# Patient Record
Sex: MALE | Race: WHITE | Employment: FULL TIME | URBAN - NONMETROPOLITAN AREA
[De-identification: names, ages, dates, MRNs, and addresses within clinical notes are randomized per-mention and may not be internally consistent; named-entity substitution may affect disease eponyms.]

---

## 2021-08-26 ENCOUNTER — APPOINTMENT (OUTPATIENT)
Dept: GENERAL RADIOLOGY | Age: 71
DRG: 603 | End: 2021-08-26
Payer: COMMERCIAL

## 2021-08-26 ENCOUNTER — HOSPITAL ENCOUNTER (INPATIENT)
Age: 71
LOS: 1 days | Discharge: ANOTHER HOSPITAL | DRG: 603 | End: 2021-08-26
Attending: EMERGENCY MEDICINE | Admitting: INTERNAL MEDICINE
Payer: COMMERCIAL

## 2021-08-26 VITALS
RESPIRATION RATE: 14 BRPM | HEART RATE: 57 BPM | SYSTOLIC BLOOD PRESSURE: 185 MMHG | HEIGHT: 68 IN | BODY MASS INDEX: 25.76 KG/M2 | OXYGEN SATURATION: 100 % | TEMPERATURE: 97.5 F | WEIGHT: 170 LBS | DIASTOLIC BLOOD PRESSURE: 87 MMHG

## 2021-08-26 DIAGNOSIS — L08.9 RIGHT FOOT INFECTION: Primary | ICD-10-CM

## 2021-08-26 LAB
ANION GAP SERPL CALCULATED.3IONS-SCNC: 11 MMOL/L (ref 9–17)
BUN BLDV-MCNC: 14 MG/DL (ref 8–23)
BUN/CREAT BLD: 22 (ref 9–20)
CALCIUM SERPL-MCNC: 9.1 MG/DL (ref 8.6–10.4)
CHLORIDE BLD-SCNC: 102 MMOL/L (ref 98–107)
CO2: 25 MMOL/L (ref 20–31)
CREAT SERPL-MCNC: 0.63 MG/DL (ref 0.7–1.2)
GFR AFRICAN AMERICAN: >60 ML/MIN
GFR NON-AFRICAN AMERICAN: >60 ML/MIN
GFR SERPL CREATININE-BSD FRML MDRD: ABNORMAL ML/MIN/{1.73_M2}
GFR SERPL CREATININE-BSD FRML MDRD: ABNORMAL ML/MIN/{1.73_M2}
GLUCOSE BLD-MCNC: 106 MG/DL (ref 70–99)
INR BLD: 1.2
LACTIC ACID, SEPSIS WHOLE BLOOD: ABNORMAL MMOL/L (ref 0.5–1.9)
LACTIC ACID, SEPSIS WHOLE BLOOD: NORMAL MMOL/L (ref 0.5–1.9)
LACTIC ACID, SEPSIS: 0.6 MMOL/L (ref 0.5–1.9)
LACTIC ACID, SEPSIS: 2.4 MMOL/L (ref 0.5–1.9)
PARTIAL THROMBOPLASTIN TIME: 36.2 SEC (ref 23.9–33.8)
POTASSIUM SERPL-SCNC: 4.5 MMOL/L (ref 3.7–5.3)
PROTHROMBIN TIME: 14.8 SEC (ref 11.5–14.2)
SARS-COV-2, RAPID: NOT DETECTED
SODIUM BLD-SCNC: 138 MMOL/L (ref 135–144)
SPECIMEN DESCRIPTION: NORMAL

## 2021-08-26 PROCEDURE — 87076 CULTURE ANAEROBE IDENT EACH: CPT

## 2021-08-26 PROCEDURE — 85025 COMPLETE CBC W/AUTO DIFF WBC: CPT

## 2021-08-26 PROCEDURE — 87185 SC STD ENZYME DETCJ PER NZM: CPT

## 2021-08-26 PROCEDURE — 87205 SMEAR GRAM STAIN: CPT

## 2021-08-26 PROCEDURE — 86403 PARTICLE AGGLUT ANTBDY SCRN: CPT

## 2021-08-26 PROCEDURE — 36415 COLL VENOUS BLD VENIPUNCTURE: CPT

## 2021-08-26 PROCEDURE — 83605 ASSAY OF LACTIC ACID: CPT

## 2021-08-26 PROCEDURE — 1200000000 HC SEMI PRIVATE

## 2021-08-26 PROCEDURE — 99284 EMERGENCY DEPT VISIT MOD MDM: CPT

## 2021-08-26 PROCEDURE — 73610 X-RAY EXAM OF ANKLE: CPT

## 2021-08-26 PROCEDURE — 87075 CULTR BACTERIA EXCEPT BLOOD: CPT

## 2021-08-26 PROCEDURE — 87186 SC STD MICRODIL/AGAR DIL: CPT

## 2021-08-26 PROCEDURE — 80048 BASIC METABOLIC PNL TOTAL CA: CPT

## 2021-08-26 PROCEDURE — 87040 BLOOD CULTURE FOR BACTERIA: CPT

## 2021-08-26 PROCEDURE — 6360000002 HC RX W HCPCS: Performed by: EMERGENCY MEDICINE

## 2021-08-26 PROCEDURE — 96365 THER/PROPH/DIAG IV INF INIT: CPT

## 2021-08-26 PROCEDURE — 87635 SARS-COV-2 COVID-19 AMP PRB: CPT

## 2021-08-26 PROCEDURE — 85730 THROMBOPLASTIN TIME PARTIAL: CPT

## 2021-08-26 PROCEDURE — 2580000003 HC RX 258: Performed by: EMERGENCY MEDICINE

## 2021-08-26 PROCEDURE — 85610 PROTHROMBIN TIME: CPT

## 2021-08-26 PROCEDURE — 87070 CULTURE OTHR SPECIMN AEROBIC: CPT

## 2021-08-26 RX ORDER — NICOTINE 21 MG/24HR
1 PATCH, TRANSDERMAL 24 HOURS TRANSDERMAL DAILY
Status: CANCELLED | OUTPATIENT
Start: 2021-08-26

## 2021-08-26 RX ORDER — ONDANSETRON 2 MG/ML
4 INJECTION INTRAMUSCULAR; INTRAVENOUS EVERY 6 HOURS PRN
Status: CANCELLED | OUTPATIENT
Start: 2021-08-26

## 2021-08-26 RX ORDER — 0.9 % SODIUM CHLORIDE 0.9 %
1000 INTRAVENOUS SOLUTION INTRAVENOUS ONCE
Status: COMPLETED | OUTPATIENT
Start: 2021-08-26 | End: 2021-08-26

## 2021-08-26 RX ORDER — ACETAMINOPHEN 325 MG/1
650 TABLET ORAL EVERY 4 HOURS PRN
Status: CANCELLED | OUTPATIENT
Start: 2021-08-26

## 2021-08-26 RX ORDER — SODIUM CHLORIDE 0.9 % (FLUSH) 0.9 %
10 SYRINGE (ML) INJECTION PRN
Status: CANCELLED | OUTPATIENT
Start: 2021-08-26

## 2021-08-26 RX ORDER — SODIUM CHLORIDE 9 MG/ML
25 INJECTION, SOLUTION INTRAVENOUS PRN
Status: CANCELLED | OUTPATIENT
Start: 2021-08-26

## 2021-08-26 RX ORDER — SODIUM CHLORIDE 0.9 % (FLUSH) 0.9 %
10 SYRINGE (ML) INJECTION EVERY 12 HOURS SCHEDULED
Status: CANCELLED | OUTPATIENT
Start: 2021-08-26

## 2021-08-26 RX ADMIN — VANCOMYCIN HYDROCHLORIDE 1250 MG: 500 INJECTION, POWDER, LYOPHILIZED, FOR SOLUTION INTRAVENOUS at 09:16

## 2021-08-26 RX ADMIN — SODIUM CHLORIDE 1000 ML: 9 INJECTION, SOLUTION INTRAVENOUS at 09:34

## 2021-08-26 ASSESSMENT — PAIN DESCRIPTION - FREQUENCY: FREQUENCY: CONTINUOUS

## 2021-08-26 ASSESSMENT — PAIN DESCRIPTION - DESCRIPTORS: DESCRIPTORS: ACHING

## 2021-08-26 ASSESSMENT — PAIN DESCRIPTION - ONSET: ONSET: ON-GOING

## 2021-08-26 ASSESSMENT — PAIN DESCRIPTION - PAIN TYPE: TYPE: ACUTE PAIN

## 2021-08-26 ASSESSMENT — PAIN SCALES - GENERAL: PAINLEVEL_OUTOF10: 10

## 2021-08-26 ASSESSMENT — PAIN - FUNCTIONAL ASSESSMENT: PAIN_FUNCTIONAL_ASSESSMENT: PREVENTS OR INTERFERES SOME ACTIVE ACTIVITIES AND ADLS

## 2021-08-26 ASSESSMENT — PAIN DESCRIPTION - PROGRESSION: CLINICAL_PROGRESSION: GRADUALLY WORSENING

## 2021-08-26 ASSESSMENT — PAIN DESCRIPTION - ORIENTATION: ORIENTATION: RIGHT

## 2021-08-26 ASSESSMENT — PAIN DESCRIPTION - LOCATION: LOCATION: FOOT

## 2021-08-26 NOTE — PROGRESS NOTES
SW contacted regarding insurance concerns. SW contacted insurance for one time allowance. Patient has no out of network coverage. SW discussed with insurance regarding circumstances. At this time no assistance. Patient aware and chose [facility] for transfer of facility. SW discussed with nurse and Attending Catalina Hanson MD. Nurse contacting [address] [place] [address] will continue to monitor needs and assist as appropriate.        Electronically signed by OLI Matt on 8/26/2021 at 11:21 AM
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
## 2021-08-26 NOTE — ED NOTES
Called [facility] for possible transfer due to [employer]. Praveena Huggins states they are at full capacity at this time and are closed to all transfers at this time. I did call administration and suggested to get outcomes involved to call their insurance and see if we can get a one time allowance at this facility due to the circumstances. Outcomes notified.      Abran Hendricks RN  08/26/21 3303

## 2021-08-26 NOTE — ED NOTES
Outcomes notified nursing that [facility] will not allow a one time allowance. Will speak with pt regarding what facility he would like to be transferred to. Outcomes assisting with this at this time.      Paulette Murillo RN  08/26/21 2348

## 2021-08-26 NOTE — Clinical Note
Patient Class: Inpatient [101]   REQUIRED: Diagnosis: Right foot infection [715359]   Estimated Length of Stay: Estimated stay of more than 2 midnights   Telemetry/Cardiac Monitoring Required?: Yes

## 2021-08-27 LAB
ABSOLUTE EOS #: 0.02 K/UL (ref 0–0.4)
ABSOLUTE IMMATURE GRANULOCYTE: 0 K/UL (ref 0–0.3)
ABSOLUTE LYMPH #: 0.43 K/UL (ref 1–4.8)
ABSOLUTE MONO #: 0.91 K/UL (ref 0.1–0.8)
BASOPHILS # BLD: 0 % (ref 0–2)
BASOPHILS ABSOLUTE: 0 K/UL (ref 0–0.2)
DIFFERENTIAL TYPE: ABNORMAL
EOSINOPHILS RELATIVE PERCENT: 1 % (ref 1–4)
HCT VFR BLD CALC: 33.9 % (ref 40.7–50.3)
HEMOGLOBIN: 10 G/DL (ref 13–17)
IMMATURE GRANULOCYTES: 0 %
LYMPHOCYTES # BLD: 18 % (ref 24–44)
MCH RBC QN AUTO: 23.8 PG (ref 25.2–33.5)
MCHC RBC AUTO-ENTMCNC: 29.5 G/DL (ref 25.2–33.5)
MCV RBC AUTO: 80.7 FL (ref 82.6–102.9)
MONOCYTES # BLD: 38 % (ref 1–7)
MORPHOLOGY: ABNORMAL
MORPHOLOGY: ABNORMAL
NRBC AUTOMATED: 0 PER 100 WBC
PDW BLD-RTO: 17.6 % (ref 11.8–14.4)
PLATELET # BLD: 159 K/UL (ref 138–453)
PLATELET ESTIMATE: ABNORMAL
PMV BLD AUTO: 10.6 FL (ref 8.1–13.5)
RBC # BLD: 4.2 M/UL (ref 4.21–5.77)
RBC # BLD: ABNORMAL 10*6/UL
SEG NEUTROPHILS: 43 % (ref 36–66)
SEGMENTED NEUTROPHILS ABSOLUTE COUNT: 1.04 K/UL (ref 1.8–7.7)
WBC # BLD: 2.4 K/UL (ref 3.5–11.3)
WBC # BLD: ABNORMAL 10*3/UL

## 2021-08-27 NOTE — ED NOTES
Pt states he would prefer to go to [facility].  I called and spoke with [facility] and they do have beds and will call me back to start the process of transfer.      Darylene Familia, RN  08/27/21 1016

## 2021-08-27 NOTE — ED NOTES
Pt states he would prefer to go to [facility].  I called and spoke with [facility] and they do have beds and will call me back to start the process of transfer.         Jc Mirza RN  08/27/21 3269

## 2021-08-28 LAB
CULTURE: ABNORMAL
DIRECT EXAM: ABNORMAL
Lab: ABNORMAL
SPECIMEN DESCRIPTION: ABNORMAL

## 2021-09-01 ENCOUNTER — TELEPHONE (OUTPATIENT)
Dept: SURGERY | Age: 71
End: 2021-09-01

## 2021-09-01 ENCOUNTER — OFFICE VISIT (OUTPATIENT)
Dept: SURGERY | Age: 71
End: 2021-09-01
Payer: COMMERCIAL

## 2021-09-01 VITALS
SYSTOLIC BLOOD PRESSURE: 156 MMHG | BODY MASS INDEX: 23.04 KG/M2 | WEIGHT: 152 LBS | HEIGHT: 68 IN | TEMPERATURE: 96.8 F | RESPIRATION RATE: 16 BRPM | DIASTOLIC BLOOD PRESSURE: 96 MMHG | HEART RATE: 67 BPM

## 2021-09-01 DIAGNOSIS — S81.801A OPEN WOUND OF RIGHT LOWER LEG, INITIAL ENCOUNTER: Primary | ICD-10-CM

## 2021-09-01 DIAGNOSIS — Z76.89 ENCOUNTER TO ESTABLISH CARE: Primary | ICD-10-CM

## 2021-09-01 DIAGNOSIS — Z76.89 ENCOUNTER TO ESTABLISH CARE: ICD-10-CM

## 2021-09-01 DIAGNOSIS — I87.2 VENOUS STASIS DERMATITIS OF RIGHT LOWER EXTREMITY: ICD-10-CM

## 2021-09-01 DIAGNOSIS — I73.9 PERIPHERAL VASCULAR DISEASE (HCC): ICD-10-CM

## 2021-09-01 LAB
CULTURE: NORMAL
CULTURE: NORMAL
Lab: NORMAL
Lab: NORMAL
SPECIMEN DESCRIPTION: NORMAL
SPECIMEN DESCRIPTION: NORMAL

## 2021-09-01 PROCEDURE — 99203 OFFICE O/P NEW LOW 30 MIN: CPT | Performed by: SURGERY

## 2021-09-01 RX ORDER — CEPHALEXIN 500 MG/1
CAPSULE ORAL
COMMUNITY
Start: 2021-08-27 | End: 2021-09-17 | Stop reason: ALTCHOICE

## 2021-09-01 RX ORDER — DOXYCYCLINE HYCLATE 100 MG/1
CAPSULE ORAL
COMMUNITY
Start: 2021-08-27 | End: 2021-09-17 | Stop reason: ALTCHOICE

## 2021-09-01 RX ORDER — FUROSEMIDE 20 MG/1
20 TABLET ORAL 2 TIMES DAILY
COMMUNITY

## 2021-09-01 NOTE — TELEPHONE ENCOUNTER
9/1/2021 Patient called to [place] office stating he had seen Dr Zeb Hwang at [facility] office today  for a right lower extremity wound. Would like nurse to call to clarify how to clean and apply ointment. Also concerned that patient has dermatitis on his legs and was there anything he could put on his legs.
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
## 2021-09-01 NOTE — PATIENT INSTRUCTIONS
Start applying Santyl cream to right foot/ankle wound daily. Cover wound with gauze or ABD pad and roll gauze/kerlix. You can find these supplies at [employer], [facility], [employer].  your Santyl prescription at your pharmacy. Follow up with Dr. Usha De Guzman in 1 week as scheduled. SIGNS OF INFECTION  - Redness, swelling, skin hot  - Wound bed turns black or stringy yellow  - Foul odor  - Increased drainage or pus  - Increased pain  - Fever greater than 100F    CALL YOUR DOCTOR OR SEEK MEDICAL ATTENTION IF SIGNS OF INFECTION.   DO NOT WAIT UNTIL YOUR NEXT APPOINTMENT    Call the Wound Care Nurse with any other questions or concerns- [phone]

## 2021-09-01 NOTE — LETTER
[facility]  [address]  Phone: [phone]  Fax: [phone] DO        September 1, 2021     Patient: Elizabeth Rivas   YOB: 1950   Date of Visit: 9/1/2021       To Whom It May Concern: It is my medical opinion that Elizabeth Rivas may return to full duty immediately with no restrictionsBe sure to keep right foot/ankle wound dressing clean, dry and intact at all times. If you have any questions or concerns, please don't hesitate to call.     Sincerely,        Mikki Hernandez, DO

## 2021-09-17 ENCOUNTER — OFFICE VISIT (OUTPATIENT)
Dept: PODIATRY | Age: 71
End: 2021-09-17
Payer: COMMERCIAL

## 2021-09-17 VITALS
WEIGHT: 150.6 LBS | BODY MASS INDEX: 22.9 KG/M2 | RESPIRATION RATE: 20 BRPM | SYSTOLIC BLOOD PRESSURE: 124 MMHG | TEMPERATURE: 97.1 F | HEART RATE: 72 BPM | DIASTOLIC BLOOD PRESSURE: 68 MMHG

## 2021-09-17 DIAGNOSIS — L97.512 ULCER OF RIGHT FOOT WITH FAT LAYER EXPOSED (HCC): Primary | ICD-10-CM

## 2021-09-17 DIAGNOSIS — I87.2 CHRONIC VENOUS INSUFFICIENCY: ICD-10-CM

## 2021-09-17 PROCEDURE — 11042 DBRDMT SUBQ TIS 1ST 20SQCM/<: CPT | Performed by: PODIATRIST

## 2021-09-17 PROCEDURE — 99203 OFFICE O/P NEW LOW 30 MIN: CPT | Performed by: PODIATRIST

## 2021-09-17 RX ORDER — ROSUVASTATIN CALCIUM 5 MG/1
TABLET, COATED ORAL
COMMUNITY
Start: 2021-09-01

## 2021-09-17 NOTE — PATIENT INSTRUCTIONS
Silver foam to right ankle wound and then unna boot to right lower leg ( toes to knee)    Today a compression dressing has been applied to your lower leg. Its purpose is to reduce swelling and help treat your wound. It will stay on until your next appointment. 1. It must stay dry. You can shower with a bag covering it or purchase a shower boot at  a medical supply store. 2. If the dressing falls more than 2 inches or gets wet, call us ([phone]) to come in  to have it changed. 3. If the top or bottom rolls, you can snip through it to relieve the constriction. 4. To help reduce swelling, when sitting elevate your leg, preferably to heart level. Avoid standing in one place for long periods of time. 5.  A rare complication is a decrease of arterial blood flow to your toes. If your   leg becomes more painful with numbness or tingling or a purple color to your toes,  carefully remove the dressing by cutting it off or unwrapping it. If normal sensation does not return to the limb, seek medical help. Return to see Pat Guajardo on Monday in wound care as scheduled.
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
## 2021-09-20 ENCOUNTER — HOSPITAL ENCOUNTER (OUTPATIENT)
Dept: WOUND CARE | Age: 71
Discharge: HOME | End: 2021-09-21
Payer: COMMERCIAL

## 2021-09-20 VITALS
BODY MASS INDEX: 22.88 KG/M2 | DIASTOLIC BLOOD PRESSURE: 78 MMHG | RESPIRATION RATE: 16 BRPM | SYSTOLIC BLOOD PRESSURE: 134 MMHG | WEIGHT: 151 LBS | HEART RATE: 72 BPM | TEMPERATURE: 97.7 F | HEIGHT: 68 IN

## 2021-09-20 DIAGNOSIS — L97.512 ULCER OF RIGHT FOOT WITH FAT LAYER EXPOSED (HCC): Primary | ICD-10-CM

## 2021-09-20 DIAGNOSIS — I87.2 CHRONIC VENOUS INSUFFICIENCY: ICD-10-CM

## 2021-09-20 PROCEDURE — 97597 DBRDMT OPN WND 1ST 20 CM/<: CPT | Performed by: PODIATRIST

## 2021-09-20 RX ORDER — LIDOCAINE 50 MG/G
OINTMENT TOPICAL ONCE
Status: CANCELLED | OUTPATIENT
Start: 2021-09-20 | End: 2021-09-20

## 2021-09-20 RX ORDER — LIDOCAINE HYDROCHLORIDE 20 MG/ML
JELLY TOPICAL ONCE
Status: CANCELLED | OUTPATIENT
Start: 2021-09-20 | End: 2021-09-20

## 2021-09-20 RX ORDER — BETAMETHASONE DIPROPIONATE 0.05 %
OINTMENT (GRAM) TOPICAL ONCE
Status: CANCELLED | OUTPATIENT
Start: 2021-09-20 | End: 2021-09-20

## 2021-09-20 RX ORDER — BACITRACIN, NEOMYCIN, POLYMYXIN B 400; 3.5; 5 [USP'U]/G; MG/G; [USP'U]/G
OINTMENT TOPICAL ONCE
Status: CANCELLED | OUTPATIENT
Start: 2021-09-20 | End: 2021-09-20

## 2021-09-20 RX ORDER — LIDOCAINE 40 MG/G
CREAM TOPICAL ONCE
Status: CANCELLED | OUTPATIENT
Start: 2021-09-20 | End: 2021-09-20

## 2021-09-20 RX ORDER — LIDOCAINE HYDROCHLORIDE 40 MG/ML
SOLUTION TOPICAL ONCE
Status: CANCELLED | OUTPATIENT
Start: 2021-09-20 | End: 2021-09-20

## 2021-09-20 RX ORDER — GENTAMICIN SULFATE 1 MG/G
OINTMENT TOPICAL ONCE
Status: CANCELLED | OUTPATIENT
Start: 2021-09-20 | End: 2021-09-20

## 2021-09-20 RX ORDER — GINSENG 100 MG
CAPSULE ORAL ONCE
Status: CANCELLED | OUTPATIENT
Start: 2021-09-20 | End: 2021-09-20

## 2021-09-20 RX ORDER — CLOBETASOL PROPIONATE 0.5 MG/G
OINTMENT TOPICAL ONCE
Status: CANCELLED | OUTPATIENT
Start: 2021-09-20 | End: 2021-09-20

## 2021-09-20 RX ORDER — BACITRACIN ZINC AND POLYMYXIN B SULFATE 500; 1000 [USP'U]/G; [USP'U]/G
OINTMENT TOPICAL ONCE
Status: CANCELLED | OUTPATIENT
Start: 2021-09-20 | End: 2021-09-20

## 2021-09-20 ASSESSMENT — PAIN SCALES - GENERAL: PAINLEVEL_OUTOF10: 0
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
## 2021-09-20 NOTE — PLAN OF CARE
Problem: Wound:  Goal: Will show signs of wound healing; wound closure and no evidence of infection  Description: Will show signs of wound healing; wound closure and no evidence of infection  Outcome: Ongoing     Problem: Pressure Ulcer:  Goal: Signs of wound healing will improve  Description: Signs of wound healing will improve  Outcome: Ongoing  Goal: Absence of new pressure ulcer  Description: Absence of new pressure ulcer  Outcome: Ongoing  Goal: Will show no infection signs and symptoms  Description: Will show no infection signs and symptoms  Outcome: Ongoing     Problem: Arterial:  Goal: Optimize blood flow for wound healing  Description: Optimize blood flow for wound healing  Outcome: Ongoing     Problem: Venous:  Goal: Signs of wound healing will improve  Description: Signs of wound healing will improve  Outcome: Ongoing     Problem: Smoking cessation:  Goal: Ability to formulate a plan to maintain a tobacco-free life will be supported  Description: Ability to formulate a plan to maintain a tobacco-free life will be supported  Outcome: Ongoing     Problem: Compression therapy:  Goal: Will be free from complications associated with compression therapy  Description: Will be free from complications associated with compression therapy  Outcome: Ongoing     Problem: Weight control:  Goal: Ability to maintain an optimal weight for height and age will be supported  Description: Ability to maintain an optimal weight for height and age will be supported  Outcome: Ongoing     Problem: Falls - Risk of:  Goal: Will remain free from falls  Description: Will remain free from falls  Outcome: Ongoing     Problem: Blood Glucose:  Goal: Ability to maintain appropriate glucose levels will improve  Description: Ability to maintain appropriate glucose levels will improve  Outcome: Ongoing

## 2021-09-28 ENCOUNTER — OFFICE VISIT (OUTPATIENT)
Dept: PODIATRY | Age: 71
End: 2021-09-28
Payer: COMMERCIAL

## 2021-09-28 VITALS
HEART RATE: 70 BPM | DIASTOLIC BLOOD PRESSURE: 88 MMHG | SYSTOLIC BLOOD PRESSURE: 164 MMHG | BODY MASS INDEX: 22.73 KG/M2 | HEIGHT: 68 IN | WEIGHT: 150 LBS

## 2021-09-28 DIAGNOSIS — L97.512 ULCER OF RIGHT FOOT WITH FAT LAYER EXPOSED (HCC): ICD-10-CM

## 2021-09-28 DIAGNOSIS — I87.2 CHRONIC VENOUS INSUFFICIENCY: Primary | ICD-10-CM

## 2021-09-28 PROCEDURE — 99999 PR OFFICE/OUTPT VISIT,PROCEDURE ONLY: CPT | Performed by: PODIATRIST

## 2021-09-28 PROCEDURE — 97597 DBRDMT OPN WND 1ST 20 CM/<: CPT | Performed by: PODIATRIST

## 2021-09-28 RX ORDER — SULFAMETHOXAZOLE AND TRIMETHOPRIM 800; 160 MG/1; MG/1
1 TABLET ORAL 2 TIMES DAILY
Qty: 20 TABLET | Refills: 0 | Status: SHIPPED | OUTPATIENT
Start: 2021-09-28 | End: 2021-10-08
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
## 2021-09-28 NOTE — PATIENT INSTRUCTIONS
optilock to right ankle and unna boot to right lower leg. Today a compression dressing has been applied to your lower leg. Its purpose is to reduce swelling and help treat your wound. It will stay on until your next appointment. 1. It must stay dry. You can shower with a bag covering it or purchase a shower boot at  a medical supply store. 2. If the dressing falls more than 2 inches or gets wet, call us ([phone]) to come in  to have it changed. 3. If the top or bottom rolls, you can snip through it to relieve the constriction. 4. To help reduce swelling, when sitting elevate your leg, preferably to heart level. Avoid standing in one place for long periods of time. 5.  A rare complication is a decrease of arterial blood flow to your toes. If your   leg becomes more painful with numbness or tingling or a purple color to your toes,  carefully remove the dressing by cutting it off or unwrapping it. If normal sensation does not return to the limb, seek medical help. Get antibiotic script that was sent to pharmacy and start taking. Return next week to see [facility] as scheduled.

## 2021-10-04 ENCOUNTER — HOSPITAL ENCOUNTER (OUTPATIENT)
Dept: WOUND CARE | Age: 71
Discharge: HOME | End: 2021-10-05
Payer: COMMERCIAL

## 2021-10-04 VITALS
DIASTOLIC BLOOD PRESSURE: 82 MMHG | WEIGHT: 146.8 LBS | SYSTOLIC BLOOD PRESSURE: 130 MMHG | BODY MASS INDEX: 22.25 KG/M2 | RESPIRATION RATE: 18 BRPM | TEMPERATURE: 96.3 F | HEART RATE: 82 BPM | HEIGHT: 68 IN

## 2021-10-04 DIAGNOSIS — I87.2 CHRONIC VENOUS INSUFFICIENCY: ICD-10-CM

## 2021-10-04 DIAGNOSIS — L97.512 ULCER OF RIGHT FOOT WITH FAT LAYER EXPOSED (HCC): Primary | ICD-10-CM

## 2021-10-04 PROCEDURE — 97597 DBRDMT OPN WND 1ST 20 CM/<: CPT | Performed by: PODIATRIST

## 2021-10-04 RX ORDER — LIDOCAINE HYDROCHLORIDE 20 MG/ML
JELLY TOPICAL ONCE
Status: CANCELLED | OUTPATIENT
Start: 2021-10-04 | End: 2021-10-04

## 2021-10-04 RX ORDER — GINSENG 100 MG
CAPSULE ORAL ONCE
Status: CANCELLED | OUTPATIENT
Start: 2021-10-04 | End: 2021-10-04

## 2021-10-04 RX ORDER — LIDOCAINE 50 MG/G
OINTMENT TOPICAL ONCE
Status: CANCELLED | OUTPATIENT
Start: 2021-10-04 | End: 2021-10-04

## 2021-10-04 RX ORDER — CLOBETASOL PROPIONATE 0.5 MG/G
OINTMENT TOPICAL ONCE
Status: CANCELLED | OUTPATIENT
Start: 2021-10-04 | End: 2021-10-04

## 2021-10-04 RX ORDER — BACITRACIN, NEOMYCIN, POLYMYXIN B 400; 3.5; 5 [USP'U]/G; MG/G; [USP'U]/G
OINTMENT TOPICAL ONCE
Status: CANCELLED | OUTPATIENT
Start: 2021-10-04 | End: 2021-10-04

## 2021-10-04 RX ORDER — BACITRACIN ZINC AND POLYMYXIN B SULFATE 500; 1000 [USP'U]/G; [USP'U]/G
OINTMENT TOPICAL ONCE
Status: CANCELLED | OUTPATIENT
Start: 2021-10-04 | End: 2021-10-04

## 2021-10-04 RX ORDER — BETAMETHASONE DIPROPIONATE 0.05 %
OINTMENT (GRAM) TOPICAL ONCE
Status: CANCELLED | OUTPATIENT
Start: 2021-10-04 | End: 2021-10-04

## 2021-10-04 RX ORDER — GENTAMICIN SULFATE 1 MG/G
OINTMENT TOPICAL ONCE
Status: CANCELLED | OUTPATIENT
Start: 2021-10-04 | End: 2021-10-04

## 2021-10-04 RX ORDER — LIDOCAINE HYDROCHLORIDE 40 MG/ML
SOLUTION TOPICAL ONCE
Status: CANCELLED | OUTPATIENT
Start: 2021-10-04 | End: 2021-10-04

## 2021-10-04 RX ORDER — LIDOCAINE 40 MG/G
CREAM TOPICAL ONCE
Status: CANCELLED | OUTPATIENT
Start: 2021-10-04 | End: 2021-10-04

## 2021-10-04 ASSESSMENT — PAIN SCALES - GENERAL: PAINLEVEL_OUTOF10: 0

## 2021-10-04 NOTE — PLAN OF CARE
Problem: Wound:  Goal: Will show signs of wound healing; wound closure and no evidence of infection  Description: Will show signs of wound healing; wound closure and no evidence of infection  Outcome: Ongoing     Problem: Compression therapy:  Goal: Will be free from complications associated with compression therapy  Description: Will be free from complications associated with compression therapy  Outcome: Ongoing     Problem: Weight control:  Goal: Ability to maintain an optimal weight for height and age will be supported  Description: Ability to maintain an optimal weight for height and age will be supported  Outcome: Ongoing

## 2021-10-04 NOTE — PROGRESS NOTES
bilateral.  negative Tinel's, bilateral.  negative Valleix sign, bilateral.  Vibratory intact bilateral.  Reflexes Decreased bilateral.  Paresthesias negative. Dysthesias negative. Sharp/dull intact bilateral.    Musculoskeletal: Muscle strength 5/5, bilateral.  Pain absent upon palpation bilateral. Normal medial longitudinal arch, bilateral.  Ankle ROM within normal limits,bilateral.  1st MPJ ROM within normal limits, bilateral.  Dorsally contracted digits absent. No other foot deformities. Integument:   Open lesion present, Right. Ulcer medial R midfoot extending up towards medial ankle, positive fibrinous tissue and nonviable tissue , and edges look healthier. increased healthier red granular base post debridement proximal area. No undermining no malodor no probing no malodor no subcutaneous tissue edema is point. Interdigital maceration absent to web spaces , Bilateral.  Nails b/l thickened, dystrophic and crumbly, discolored with subungual debris.   Fissures absent, Bilateral. Hyperkeratotic   Wound 09/01/21 Foot Right;Upper (Active)   Wound Etiology Venous 09/20/21 1353   Dressing Status Old drainage noted 10/04/21 0801   Wound Cleansed Cleansed with saline 10/04/21 0801   Dressing/Treatment Foam;Zinc paste 10/04/21 0801   Offloading for Diabetic Foot Ulcers No offloading required 10/04/21 0801   Dressing Change Due 10/11/21 10/04/21 0801   Wound Length (cm) 6.5 cm 10/04/21 0801   Wound Width (cm) 3 cm 10/04/21 0801   Wound Depth (cm) 0.3 cm 10/04/21 0801   Wound Surface Area (cm^2) 19.5 cm^2 10/04/21 0801   Change in Wound Size % (l*w) -173.11 10/04/21 0801   Wound Volume (cm^3) 5.85 cm^3 10/04/21 0801   Wound Healing % -173 10/04/21 0801   Post-Procedure Length (cm) 6.5 cm 10/04/21 0816   Post-Procedure Width (cm) 3 cm 10/04/21 0816   Post-Procedure Depth (cm) 0.3 cm 10/04/21 0816   Post-Procedure Surface Area (cm^2) 19.5 cm^2 10/04/21 0816   Post-Procedure Volume (cm^3) 5.85 cm^3 10/04/21 0816 Wound Assessment Pink/red;Slough 10/04/21 0801   Drainage Amount Moderate 10/04/21 0801   Drainage Description Serosanguinous 10/04/21 0801   Odor Mild 10/04/21 0801   Eliane-wound Assessment Dry/flaky 10/04/21 0801   Margins Attached edges 10/04/21 0801   Wound Thickness Description not for Pressure Injury Full thickness 10/04/21 0801   Number of days: 32     tissue is absent      Asessment: Patient is a 73 y.o. male with:   Hospital Problems         Last Modified POA    Chronic venous insufficiency R/L (Chronic) 10/4/2021 Yes    Ulcer of right foot with fat layer exposed ([address] 10/4/2021 Yes          Diagnosis Orders   1. Ulcer of right foot with fat layer exposed ([address]     2. Chronic venous insufficiency         Ulcer Classification:  Venous insufficiency ulcer grade 2 C. IDSA  no infection. Plan:   Patient examined and evaluated. Current condition and treatment options discussed in detail. Active wound management took place 100% non excisional with the use of  curette and tissue nippers. All non viable tissue (including epidermis and/or dermis) and bio burden was removed to promote healing. Bleeding was present. Please see chart for exact measurements, if not documented then size was less than 20 sq cm. Still awaiting pre approval for skin substitute. Compression: Patient had an unna boot and coban applied to the Right lower extremities. This was applied for control of leg edema. Patient educated on appropriate use and will keep the dressing dry and intact. Any increase in pain or if the dressing should fall down or slip, they should contact the clinic immediately. Also advised importance of continued elevation of the leg. Today's dressing:foam silver. Contact clinic with any questions/problems/concerns or new signs of infection. Follow-up at [facility] in 1 week(s).

## 2021-10-05 ENCOUNTER — TELEPHONE (OUTPATIENT)
Dept: WOUND CARE | Age: 71
End: 2021-10-05

## 2021-10-05 NOTE — TELEPHONE ENCOUNTER
Patient canceled wound care appointment with Dr. Shabazz Fell 10/11/2021 because of work. Writer offered to reschedule him to 10/19/2021 in the next wound care day but patient wasn't sure if he needed to be seen sooner for the wraps. Please call him back at [phone].

## 2021-10-11 ENCOUNTER — HOSPITAL ENCOUNTER (OUTPATIENT)
Dept: WOUND CARE | Age: 71
Discharge: HOME | End: 2021-10-12
Payer: COMMERCIAL

## 2021-10-11 VITALS
RESPIRATION RATE: 20 BRPM | DIASTOLIC BLOOD PRESSURE: 80 MMHG | BODY MASS INDEX: 22.31 KG/M2 | SYSTOLIC BLOOD PRESSURE: 132 MMHG | HEART RATE: 84 BPM | TEMPERATURE: 97.7 F | HEIGHT: 68 IN | WEIGHT: 147.2 LBS

## 2021-10-11 DIAGNOSIS — L97.512 ULCER OF RIGHT FOOT WITH FAT LAYER EXPOSED (HCC): Primary | ICD-10-CM

## 2021-10-11 DIAGNOSIS — I87.2 CHRONIC VENOUS INSUFFICIENCY: ICD-10-CM

## 2021-10-11 PROCEDURE — 15275 SKIN SUB GRAFT FACE/NK/HF/G: CPT | Performed by: PODIATRIST

## 2021-10-11 RX ORDER — LIDOCAINE HYDROCHLORIDE 20 MG/ML
JELLY TOPICAL ONCE
Status: CANCELLED | OUTPATIENT
Start: 2021-10-11 | End: 2021-10-11

## 2021-10-11 RX ORDER — BACITRACIN, NEOMYCIN, POLYMYXIN B 400; 3.5; 5 [USP'U]/G; MG/G; [USP'U]/G
OINTMENT TOPICAL ONCE
Status: CANCELLED | OUTPATIENT
Start: 2021-10-11 | End: 2021-10-11

## 2021-10-11 RX ORDER — LIDOCAINE 40 MG/G
CREAM TOPICAL ONCE
Status: CANCELLED | OUTPATIENT
Start: 2021-10-11 | End: 2021-10-11

## 2021-10-11 RX ORDER — LIDOCAINE 50 MG/G
OINTMENT TOPICAL ONCE
Status: CANCELLED | OUTPATIENT
Start: 2021-10-11 | End: 2021-10-11

## 2021-10-11 RX ORDER — CLOBETASOL PROPIONATE 0.5 MG/G
OINTMENT TOPICAL ONCE
Status: CANCELLED | OUTPATIENT
Start: 2021-10-11 | End: 2021-10-11

## 2021-10-11 RX ORDER — GINSENG 100 MG
CAPSULE ORAL ONCE
Status: CANCELLED | OUTPATIENT
Start: 2021-10-11 | End: 2021-10-11

## 2021-10-11 RX ORDER — BETAMETHASONE DIPROPIONATE 0.05 %
OINTMENT (GRAM) TOPICAL ONCE
Status: CANCELLED | OUTPATIENT
Start: 2021-10-11 | End: 2021-10-11

## 2021-10-11 RX ORDER — BACITRACIN ZINC AND POLYMYXIN B SULFATE 500; 1000 [USP'U]/G; [USP'U]/G
OINTMENT TOPICAL ONCE
Status: CANCELLED | OUTPATIENT
Start: 2021-10-11 | End: 2021-10-11

## 2021-10-11 RX ORDER — GENTAMICIN SULFATE 1 MG/G
OINTMENT TOPICAL ONCE
Status: CANCELLED | OUTPATIENT
Start: 2021-10-11 | End: 2021-10-11

## 2021-10-11 RX ORDER — LIDOCAINE HYDROCHLORIDE 40 MG/ML
SOLUTION TOPICAL ONCE
Status: CANCELLED | OUTPATIENT
Start: 2021-10-11 | End: 2021-10-11
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
## 2021-10-19 ENCOUNTER — HOSPITAL ENCOUNTER (OUTPATIENT)
Dept: WOUND CARE | Age: 71
Discharge: HOME | End: 2021-10-20
Payer: COMMERCIAL

## 2021-10-19 VITALS
WEIGHT: 152.8 LBS | RESPIRATION RATE: 20 BRPM | BODY MASS INDEX: 23.16 KG/M2 | HEIGHT: 68 IN | DIASTOLIC BLOOD PRESSURE: 70 MMHG | SYSTOLIC BLOOD PRESSURE: 128 MMHG | HEART RATE: 84 BPM | TEMPERATURE: 98 F

## 2021-10-19 DIAGNOSIS — I87.2 CHRONIC VENOUS INSUFFICIENCY: ICD-10-CM

## 2021-10-19 DIAGNOSIS — L97.512 ULCER OF RIGHT FOOT WITH FAT LAYER EXPOSED (HCC): Primary | ICD-10-CM

## 2021-10-19 PROCEDURE — 15275 SKIN SUB GRAFT FACE/NK/HF/G: CPT | Performed by: PODIATRIST

## 2021-10-19 RX ORDER — GENTAMICIN SULFATE 1 MG/G
OINTMENT TOPICAL ONCE
Status: CANCELLED | OUTPATIENT
Start: 2021-10-19 | End: 2021-10-19

## 2021-10-19 RX ORDER — LIDOCAINE 40 MG/G
CREAM TOPICAL ONCE
Status: CANCELLED | OUTPATIENT
Start: 2021-10-19 | End: 2021-10-19

## 2021-10-19 RX ORDER — LIDOCAINE 50 MG/G
OINTMENT TOPICAL ONCE
Status: CANCELLED | OUTPATIENT
Start: 2021-10-19 | End: 2021-10-19

## 2021-10-19 RX ORDER — GINSENG 100 MG
CAPSULE ORAL ONCE
Status: CANCELLED | OUTPATIENT
Start: 2021-10-19 | End: 2021-10-19

## 2021-10-19 RX ORDER — LIDOCAINE HYDROCHLORIDE 20 MG/ML
JELLY TOPICAL ONCE
Status: CANCELLED | OUTPATIENT
Start: 2021-10-19 | End: 2021-10-19

## 2021-10-19 RX ORDER — BACITRACIN ZINC AND POLYMYXIN B SULFATE 500; 1000 [USP'U]/G; [USP'U]/G
OINTMENT TOPICAL ONCE
Status: CANCELLED | OUTPATIENT
Start: 2021-10-19 | End: 2021-10-19

## 2021-10-19 RX ORDER — LIDOCAINE HYDROCHLORIDE 40 MG/ML
SOLUTION TOPICAL ONCE
Status: CANCELLED | OUTPATIENT
Start: 2021-10-19 | End: 2021-10-19

## 2021-10-19 RX ORDER — CLOBETASOL PROPIONATE 0.5 MG/G
OINTMENT TOPICAL ONCE
Status: CANCELLED | OUTPATIENT
Start: 2021-10-19 | End: 2021-10-19

## 2021-10-19 RX ORDER — BACITRACIN, NEOMYCIN, POLYMYXIN B 400; 3.5; 5 [USP'U]/G; MG/G; [USP'U]/G
OINTMENT TOPICAL ONCE
Status: CANCELLED | OUTPATIENT
Start: 2021-10-19 | End: 2021-10-19

## 2021-10-19 RX ORDER — BETAMETHASONE DIPROPIONATE 0.05 %
OINTMENT (GRAM) TOPICAL ONCE
Status: CANCELLED | OUTPATIENT
Start: 2021-10-19 | End: 2021-10-19
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
## 2021-10-19 NOTE — PROGRESS NOTES
Apligraf Treatment Note    NAME:  Kash Sanderson  YOB: 1950  MEDICAL RECORD NUMBER:  [id number]  DATE:  10/19/2021    Goal: Patient will receive safe and proper application of skin substitute. Patient will comply with caring for dressing, offloading and reporting complications. [x]Expiration date and pH of Apligraf checked immediately prior to use. [x] Package intact prior to use and no damage noted. [x] Transport temperature controlled and acceptable. Apligraf was removed from protective sterile packaging by physician and applied to prepared wound bed. Apligraf was placed dermal side down onto the wound bed. Apligraf was applied to right foot and affixed with steri-strips by the physician. [x] Apligraf was covered with non-adherent ulcer dressing. .   [x] Applied dry gauze and/or roll gauze. [x] Coban or ace wrap was applied to secure graft and decrease edema. Patient/caregiver was instructed not to remove dressing and to keep it clean         and dry. Pt/family/caregiver was instructed on signs and symptoms of complications to report such as draining through dressing, dressing falling down/slipping,getting wet, or severe pain or tingling in toes   Pt/family/caregiver was instructed on need for offloading and elevation of               affected extremity and on use of prescribed offloading device. Apligraf may be applied a total of 5 times per wound over a 12 week period. Date of first application of Apligraf for this current wound is October 11, 2021.        Guidelines followed   2/5 application applied     Electronically signed by Julienne Bowen RN on 10/19/2021 at 9:06 AM

## 2021-10-19 NOTE — PLAN OF CARE
Problem: Pain:  Goal: Pain level will decrease  Description: Pain level will decrease  Outcome: Ongoing  Goal: Control of acute pain  Description: Control of acute pain  Outcome: Ongoing  Goal: Control of chronic pain  Description: Control of chronic pain  Outcome: Ongoing     Problem: Wound:  Goal: Will show signs of wound healing; wound closure and no evidence of infection  Description: Will show signs of wound healing; wound closure and no evidence of infection  Outcome: Ongoing     Problem: Venous:  Goal: Signs of wound healing will improve  Description: Signs of wound healing will improve  Outcome: Ongoing     Problem: Smoking cessation:  Goal: Ability to formulate a plan to maintain a tobacco-free life will be supported  Description: Ability to formulate a plan to maintain a tobacco-free life will be supported  Outcome: Ongoing     Problem: Compression therapy:  Goal: Will be free from complications associated with compression therapy  Description: Will be free from complications associated with compression therapy  Outcome: Ongoing     Problem: Weight control:  Goal: Ability to maintain an optimal weight for height and age will be supported  Description: Ability to maintain an optimal weight for height and age will be supported  Outcome: Ongoing     Problem: Falls - Risk of:  Goal: Will remain free from falls  Description: Will remain free from falls  Outcome: Ongoing

## 2021-10-25 ENCOUNTER — HOSPITAL ENCOUNTER (OUTPATIENT)
Dept: WOUND CARE | Age: 71
Discharge: HOME | End: 2021-10-26
Payer: COMMERCIAL

## 2021-10-25 VITALS
HEIGHT: 68 IN | BODY MASS INDEX: 22.43 KG/M2 | DIASTOLIC BLOOD PRESSURE: 80 MMHG | RESPIRATION RATE: 18 BRPM | SYSTOLIC BLOOD PRESSURE: 128 MMHG | HEART RATE: 67 BPM | TEMPERATURE: 97.1 F | WEIGHT: 148 LBS

## 2021-10-25 DIAGNOSIS — I87.2 CHRONIC VENOUS INSUFFICIENCY: ICD-10-CM

## 2021-10-25 DIAGNOSIS — L97.512 ULCER OF RIGHT FOOT WITH FAT LAYER EXPOSED (HCC): Primary | ICD-10-CM

## 2021-10-25 PROCEDURE — 15275 SKIN SUB GRAFT FACE/NK/HF/G: CPT | Performed by: PODIATRIST

## 2021-10-25 RX ORDER — BACITRACIN, NEOMYCIN, POLYMYXIN B 400; 3.5; 5 [USP'U]/G; MG/G; [USP'U]/G
OINTMENT TOPICAL ONCE
Status: CANCELLED | OUTPATIENT
Start: 2021-10-25 | End: 2021-10-25

## 2021-10-25 RX ORDER — CLOBETASOL PROPIONATE 0.5 MG/G
OINTMENT TOPICAL ONCE
Status: CANCELLED | OUTPATIENT
Start: 2021-10-25 | End: 2021-10-25

## 2021-10-25 RX ORDER — BACITRACIN ZINC AND POLYMYXIN B SULFATE 500; 1000 [USP'U]/G; [USP'U]/G
OINTMENT TOPICAL ONCE
Status: CANCELLED | OUTPATIENT
Start: 2021-10-25 | End: 2021-10-25

## 2021-10-25 RX ORDER — BETAMETHASONE DIPROPIONATE 0.05 %
OINTMENT (GRAM) TOPICAL ONCE
Status: CANCELLED | OUTPATIENT
Start: 2021-10-25 | End: 2021-10-25

## 2021-10-25 RX ORDER — LIDOCAINE HYDROCHLORIDE 20 MG/ML
JELLY TOPICAL ONCE
Status: CANCELLED | OUTPATIENT
Start: 2021-10-25 | End: 2021-10-25

## 2021-10-25 RX ORDER — LIDOCAINE HYDROCHLORIDE 40 MG/ML
SOLUTION TOPICAL ONCE
Status: CANCELLED | OUTPATIENT
Start: 2021-10-25 | End: 2021-10-25

## 2021-10-25 RX ORDER — LIDOCAINE 50 MG/G
OINTMENT TOPICAL ONCE
Status: CANCELLED | OUTPATIENT
Start: 2021-10-25 | End: 2021-10-25

## 2021-10-25 RX ORDER — LIDOCAINE 40 MG/G
CREAM TOPICAL ONCE
Status: CANCELLED | OUTPATIENT
Start: 2021-10-25 | End: 2021-10-25

## 2021-10-25 RX ORDER — GENTAMICIN SULFATE 1 MG/G
OINTMENT TOPICAL ONCE
Status: CANCELLED | OUTPATIENT
Start: 2021-10-25 | End: 2021-10-25

## 2021-10-25 RX ORDER — GINSENG 100 MG
CAPSULE ORAL ONCE
Status: CANCELLED | OUTPATIENT
Start: 2021-10-25 | End: 2021-10-25

## 2021-10-25 ASSESSMENT — PAIN SCALES - GENERAL: PAINLEVEL_OUTOF10: 10
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
## 2021-11-01 ENCOUNTER — HOSPITAL ENCOUNTER (OUTPATIENT)
Dept: WOUND CARE | Age: 71
Discharge: HOME | End: 2021-11-02
Payer: COMMERCIAL

## 2021-11-01 VITALS
SYSTOLIC BLOOD PRESSURE: 118 MMHG | RESPIRATION RATE: 16 BRPM | HEART RATE: 70 BPM | TEMPERATURE: 96.2 F | DIASTOLIC BLOOD PRESSURE: 68 MMHG

## 2021-11-01 DIAGNOSIS — L97.512 ULCER OF RIGHT FOOT WITH FAT LAYER EXPOSED (HCC): Primary | ICD-10-CM

## 2021-11-01 DIAGNOSIS — I87.2 CHRONIC VENOUS INSUFFICIENCY: ICD-10-CM

## 2021-11-01 PROCEDURE — 97597 DBRDMT OPN WND 1ST 20 CM/<: CPT | Performed by: PODIATRIST

## 2021-11-01 RX ORDER — CLOBETASOL PROPIONATE 0.5 MG/G
OINTMENT TOPICAL ONCE
Status: CANCELLED | OUTPATIENT
Start: 2021-11-01 | End: 2021-11-01

## 2021-11-01 RX ORDER — LIDOCAINE 40 MG/G
CREAM TOPICAL ONCE
Status: CANCELLED | OUTPATIENT
Start: 2021-11-01 | End: 2021-11-01

## 2021-11-01 RX ORDER — BETAMETHASONE DIPROPIONATE 0.05 %
OINTMENT (GRAM) TOPICAL ONCE
Status: CANCELLED | OUTPATIENT
Start: 2021-11-01 | End: 2021-11-01

## 2021-11-01 RX ORDER — BACITRACIN, NEOMYCIN, POLYMYXIN B 400; 3.5; 5 [USP'U]/G; MG/G; [USP'U]/G
OINTMENT TOPICAL ONCE
Status: CANCELLED | OUTPATIENT
Start: 2021-11-01 | End: 2021-11-01

## 2021-11-01 RX ORDER — GENTAMICIN SULFATE 1 MG/G
OINTMENT TOPICAL ONCE
Status: CANCELLED | OUTPATIENT
Start: 2021-11-01 | End: 2021-11-01

## 2021-11-01 RX ORDER — LIDOCAINE HYDROCHLORIDE 20 MG/ML
JELLY TOPICAL ONCE
Status: CANCELLED | OUTPATIENT
Start: 2021-11-01 | End: 2021-11-01

## 2021-11-01 RX ORDER — GINSENG 100 MG
CAPSULE ORAL ONCE
Status: CANCELLED | OUTPATIENT
Start: 2021-11-01 | End: 2021-11-01

## 2021-11-01 RX ORDER — LIDOCAINE HYDROCHLORIDE 40 MG/ML
SOLUTION TOPICAL ONCE
Status: CANCELLED | OUTPATIENT
Start: 2021-11-01 | End: 2021-11-01

## 2021-11-01 RX ORDER — LIDOCAINE 50 MG/G
OINTMENT TOPICAL ONCE
Status: CANCELLED | OUTPATIENT
Start: 2021-11-01 | End: 2021-11-01

## 2021-11-01 RX ORDER — BACITRACIN ZINC AND POLYMYXIN B SULFATE 500; 1000 [USP'U]/G; [USP'U]/G
OINTMENT TOPICAL ONCE
Status: CANCELLED | OUTPATIENT
Start: 2021-11-01 | End: 2021-11-01

## 2021-11-01 RX ORDER — CEPHALEXIN 500 MG/1
500 CAPSULE ORAL 3 TIMES DAILY
Qty: 30 CAPSULE | Refills: 0 | Status: SHIPPED | OUTPATIENT
Start: 2021-11-01 | End: 2021-11-11

## 2021-11-01 ASSESSMENT — PAIN SCALES - GENERAL: PAINLEVEL_OUTOF10: 10
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
## 2021-11-05 ENCOUNTER — HOSPITAL ENCOUNTER (OUTPATIENT)
Dept: WOUND CARE | Age: 71
Discharge: HOME | End: 2021-11-06
Payer: COMMERCIAL

## 2021-11-05 VITALS
RESPIRATION RATE: 18 BRPM | TEMPERATURE: 98.4 F | HEART RATE: 74 BPM | HEIGHT: 68 IN | BODY MASS INDEX: 22.5 KG/M2 | DIASTOLIC BLOOD PRESSURE: 80 MMHG | SYSTOLIC BLOOD PRESSURE: 136 MMHG

## 2021-11-05 DIAGNOSIS — L97.512 ULCER OF RIGHT FOOT WITH FAT LAYER EXPOSED (HCC): Primary | ICD-10-CM

## 2021-11-05 PROCEDURE — 29580 STRAPPING UNNA BOOT: CPT

## 2021-11-05 NOTE — PROGRESS NOTES
Patient presented for dressing change per order of Dr. Ketty Mo. Old dressing removed. Periwound and ulcer(s) cleansed with normal saline. New dressing (silver foam and unna boot) applied to right leg. [place] Application   Below Knee    NAME:  Luisito Solis  YOB: 1950  MEDICAL RECORD NUMBER:  [id number]  DATE:  11/5/2021    Renee Can boot: Applied moisturizing agent to dry skin as needed. Appied primary and secondary dressing as ordered. Applied Unna roll from toes to knee overlapping each time. Applied ace wrap or coban from toes to below the knee. Instructed patient/caregiver to keep dressing dry and intact. DO NOT REMOVE DRESSING. Instructed pt/family/caregiver to report excessive draining, loose bandage, wet dressing, severe pain or tingling in toes. Applied [place] dressing below the knee to right lower leg. Unna Boot(s) were applied per  Guidelines.      Electronically signed by Cody Denton RN on 11/5/2021 at 8:12 AM

## 2021-11-15 ENCOUNTER — HOSPITAL ENCOUNTER (OUTPATIENT)
Dept: WOUND CARE | Age: 71
Discharge: HOME | End: 2021-11-16
Payer: COMMERCIAL

## 2021-11-15 VITALS
HEART RATE: 80 BPM | RESPIRATION RATE: 20 BRPM | SYSTOLIC BLOOD PRESSURE: 132 MMHG | DIASTOLIC BLOOD PRESSURE: 80 MMHG | BODY MASS INDEX: 23.76 KG/M2 | HEIGHT: 68 IN | WEIGHT: 156.8 LBS | TEMPERATURE: 98.4 F

## 2021-11-15 DIAGNOSIS — L97.412 ULCER OF RIGHT MIDFOOT WITH FAT LAYER EXPOSED (HCC): ICD-10-CM

## 2021-11-15 DIAGNOSIS — L97.512 ULCER OF RIGHT FOOT WITH FAT LAYER EXPOSED (HCC): Primary | ICD-10-CM

## 2021-11-15 DIAGNOSIS — M25.571 CHRONIC PAIN OF RIGHT ANKLE: ICD-10-CM

## 2021-11-15 DIAGNOSIS — G89.29 CHRONIC PAIN OF RIGHT ANKLE: ICD-10-CM

## 2021-11-15 DIAGNOSIS — I87.2 CHRONIC VENOUS INSUFFICIENCY: ICD-10-CM

## 2021-11-15 DIAGNOSIS — M79.671 RIGHT FOOT PAIN: ICD-10-CM

## 2021-11-15 PROCEDURE — 15275 SKIN SUB GRAFT FACE/NK/HF/G: CPT | Performed by: PODIATRIST

## 2021-11-15 RX ORDER — LIDOCAINE 40 MG/G
CREAM TOPICAL ONCE
Status: CANCELLED | OUTPATIENT
Start: 2021-11-15 | End: 2021-11-15

## 2021-11-15 RX ORDER — LIDOCAINE HYDROCHLORIDE 20 MG/ML
JELLY TOPICAL ONCE
Status: CANCELLED | OUTPATIENT
Start: 2021-11-15 | End: 2021-11-15

## 2021-11-15 RX ORDER — TRAMADOL HYDROCHLORIDE 50 MG/1
50 TABLET ORAL EVERY 6 HOURS PRN
Qty: 30 TABLET | Refills: 0 | Status: SHIPPED | OUTPATIENT
Start: 2021-11-15 | End: 2021-12-13

## 2021-11-15 RX ORDER — GINSENG 100 MG
CAPSULE ORAL ONCE
Status: CANCELLED | OUTPATIENT
Start: 2021-11-15 | End: 2021-11-15

## 2021-11-15 RX ORDER — BACITRACIN, NEOMYCIN, POLYMYXIN B 400; 3.5; 5 [USP'U]/G; MG/G; [USP'U]/G
OINTMENT TOPICAL ONCE
Status: CANCELLED | OUTPATIENT
Start: 2021-11-15 | End: 2021-11-15

## 2021-11-15 RX ORDER — CLOBETASOL PROPIONATE 0.5 MG/G
OINTMENT TOPICAL ONCE
Status: CANCELLED | OUTPATIENT
Start: 2021-11-15 | End: 2021-11-15

## 2021-11-15 RX ORDER — LIDOCAINE 50 MG/G
OINTMENT TOPICAL ONCE
Status: CANCELLED | OUTPATIENT
Start: 2021-11-15 | End: 2021-11-15

## 2021-11-15 RX ORDER — BETAMETHASONE DIPROPIONATE 0.05 %
OINTMENT (GRAM) TOPICAL ONCE
Status: CANCELLED | OUTPATIENT
Start: 2021-11-15 | End: 2021-11-15

## 2021-11-15 RX ORDER — LIDOCAINE HYDROCHLORIDE 40 MG/ML
SOLUTION TOPICAL ONCE
Status: CANCELLED | OUTPATIENT
Start: 2021-11-15 | End: 2021-11-15

## 2021-11-15 RX ORDER — BACITRACIN ZINC AND POLYMYXIN B SULFATE 500; 1000 [USP'U]/G; [USP'U]/G
OINTMENT TOPICAL ONCE
Status: CANCELLED | OUTPATIENT
Start: 2021-11-15 | End: 2021-11-15

## 2021-11-15 RX ORDER — GENTAMICIN SULFATE 1 MG/G
OINTMENT TOPICAL ONCE
Status: CANCELLED | OUTPATIENT
Start: 2021-11-15 | End: 2021-11-15

## 2021-11-15 ASSESSMENT — PAIN SCALES - GENERAL: PAINLEVEL_OUTOF10: 10

## 2021-11-15 ASSESSMENT — PAIN DESCRIPTION - PAIN TYPE: TYPE: ACUTE PAIN
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
## 2021-11-15 NOTE — PROGRESS NOTES
Apligraf Treatment Note    NAME:  Eri Snowden  YOB: 1950  MEDICAL RECORD NUMBER:  [id number]  DATE:  11/15/2021    Goal: Patient will receive safe and proper application of skin substitute. Patient will comply with caring for dressing, offloading and reporting complications. [x]Expiration date and pH of Apligraf checked immediately prior to use. [x] Package intact prior to use and no damage noted. [x] Transport temperature controlled and acceptable. Apligraf was removed from protective sterile packaging by physician and applied to prepared wound bed. Apligraf was placed dermal side down onto the wound bed. Apligraf was applied to right ankle and affixed with steri-strips by the physician. [x] Apligraf was covered with non-adherent ulcer dressing. [x] Applied dry gauze and/or roll gauze. [x] Coban or ace wrap was applied to secure graft and decrease edema. Patient/caregiver was instructed not to remove dressing and to keep it clean         and dry. Pt/family/caregiver was instructed on signs and symptoms of complications to report such as draining through dressing, dressing falling down/slipping,getting wet, or severe pain or tingling in toes   Pt/family/caregiver was instructed on need for offloading and elevation of               affected extremity and on use of prescribed offloading device. Apligraf may be applied a total of 5 times per wound over a 12 week period. Date of first application of Apligraf for this current wound is October 11, 2021 4/5 applied.        Guidelines followed    Electronically signed by Justyn Hodges RN on 11/15/2021 at 3:09 PM

## 2021-11-19 ENCOUNTER — HOSPITAL ENCOUNTER (OUTPATIENT)
Dept: WOUND CARE | Age: 71
Discharge: HOME | End: 2021-11-20

## 2021-11-19 VITALS
RESPIRATION RATE: 20 BRPM | TEMPERATURE: 97.6 F | BODY MASS INDEX: 23.49 KG/M2 | HEART RATE: 76 BPM | SYSTOLIC BLOOD PRESSURE: 128 MMHG | WEIGHT: 155 LBS | DIASTOLIC BLOOD PRESSURE: 80 MMHG | HEIGHT: 68 IN

## 2021-11-19 DIAGNOSIS — L97.512 ULCER OF RIGHT FOOT WITH FAT LAYER EXPOSED (HCC): Primary | ICD-10-CM

## 2021-11-19 NOTE — PROGRESS NOTES
Patient presented for dressing change per order of Dr. Ahmet Heard. Old unna boot removed. Apligraf intact. New dressing(unna boot) applied to right leg. [place] Application   Below Knee    NAME:  Boom Rios  YOB: 1950  MEDICAL RECORD NUMBER:  [id number]  DATE:  11/19/2021    Lizzy Nickels boot: Applied moisturizing agent to dry skin as needed. Appied primary and secondary dressing as ordered. Applied Unna roll from toes to knee overlapping each time. Applied ace wrap or coban from toes to below the knee. Instructed patient/caregiver to keep dressing dry and intact. DO NOT REMOVE DRESSING. Instructed pt/family/caregiver to report excessive draining, loose bandage, wet dressing, severe pain or tingling in toes. Applied [place] dressing below the knee to right lower leg. Unna Boot(s) were applied per  Guidelines.      Electronically signed by Ana M Rousseau RN on 11/19/2021 at 9:03 AM

## 2021-11-23 ENCOUNTER — TELEPHONE (OUTPATIENT)
Dept: WOUND CARE | Age: 71
End: 2021-11-23

## 2021-11-29 ENCOUNTER — HOSPITAL ENCOUNTER (OUTPATIENT)
Dept: WOUND CARE | Age: 71
Discharge: HOME | End: 2021-11-30
Payer: COMMERCIAL

## 2021-11-29 VITALS
SYSTOLIC BLOOD PRESSURE: 130 MMHG | HEIGHT: 68 IN | HEART RATE: 72 BPM | TEMPERATURE: 96.5 F | RESPIRATION RATE: 18 BRPM | BODY MASS INDEX: 22.88 KG/M2 | DIASTOLIC BLOOD PRESSURE: 70 MMHG | WEIGHT: 151 LBS

## 2021-11-29 DIAGNOSIS — L97.512 ULCER OF RIGHT FOOT WITH FAT LAYER EXPOSED (HCC): Primary | ICD-10-CM

## 2021-11-29 DIAGNOSIS — I87.2 CHRONIC VENOUS INSUFFICIENCY: ICD-10-CM

## 2021-11-29 PROCEDURE — 97597 DBRDMT OPN WND 1ST 20 CM/<: CPT | Performed by: PODIATRIST

## 2021-11-29 RX ORDER — LIDOCAINE HYDROCHLORIDE 40 MG/ML
SOLUTION TOPICAL ONCE
Status: CANCELLED | OUTPATIENT
Start: 2021-11-29 | End: 2021-11-29

## 2021-11-29 RX ORDER — LIDOCAINE 40 MG/G
CREAM TOPICAL ONCE
Status: CANCELLED | OUTPATIENT
Start: 2021-11-29 | End: 2021-11-29

## 2021-11-29 RX ORDER — LIDOCAINE HYDROCHLORIDE 20 MG/ML
JELLY TOPICAL ONCE
Status: CANCELLED | OUTPATIENT
Start: 2021-11-29 | End: 2021-11-29

## 2021-11-29 RX ORDER — GINSENG 100 MG
CAPSULE ORAL ONCE
Status: CANCELLED | OUTPATIENT
Start: 2021-11-29 | End: 2021-11-29

## 2021-11-29 RX ORDER — GENTAMICIN SULFATE 1 MG/G
OINTMENT TOPICAL ONCE
Status: CANCELLED | OUTPATIENT
Start: 2021-11-29 | End: 2021-11-29

## 2021-11-29 RX ORDER — CLOBETASOL PROPIONATE 0.5 MG/G
OINTMENT TOPICAL ONCE
Status: CANCELLED | OUTPATIENT
Start: 2021-11-29 | End: 2021-11-29

## 2021-11-29 RX ORDER — BACITRACIN, NEOMYCIN, POLYMYXIN B 400; 3.5; 5 [USP'U]/G; MG/G; [USP'U]/G
OINTMENT TOPICAL ONCE
Status: CANCELLED | OUTPATIENT
Start: 2021-11-29 | End: 2021-11-29

## 2021-11-29 RX ORDER — LIDOCAINE 50 MG/G
OINTMENT TOPICAL ONCE
Status: CANCELLED | OUTPATIENT
Start: 2021-11-29 | End: 2021-11-29

## 2021-11-29 RX ORDER — BACITRACIN ZINC AND POLYMYXIN B SULFATE 500; 1000 [USP'U]/G; [USP'U]/G
OINTMENT TOPICAL ONCE
Status: CANCELLED | OUTPATIENT
Start: 2021-11-29 | End: 2021-11-29

## 2021-11-29 RX ORDER — BETAMETHASONE DIPROPIONATE 0.05 %
OINTMENT (GRAM) TOPICAL ONCE
Status: CANCELLED | OUTPATIENT
Start: 2021-11-29 | End: 2021-11-29

## 2021-11-29 ASSESSMENT — PAIN SCALES - GENERAL: PAINLEVEL_OUTOF10: 0
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
## 2021-12-06 ENCOUNTER — HOSPITAL ENCOUNTER (OUTPATIENT)
Dept: WOUND CARE | Age: 71
Discharge: HOME | End: 2021-12-07
Payer: COMMERCIAL

## 2021-12-06 VITALS
DIASTOLIC BLOOD PRESSURE: 75 MMHG | WEIGHT: 151.6 LBS | RESPIRATION RATE: 22 BRPM | TEMPERATURE: 97.8 F | HEIGHT: 68 IN | BODY MASS INDEX: 22.97 KG/M2 | SYSTOLIC BLOOD PRESSURE: 137 MMHG | HEART RATE: 76 BPM

## 2021-12-06 DIAGNOSIS — I87.2 CHRONIC VENOUS INSUFFICIENCY: ICD-10-CM

## 2021-12-06 DIAGNOSIS — L97.512 ULCER OF RIGHT FOOT WITH FAT LAYER EXPOSED (HCC): Primary | ICD-10-CM

## 2021-12-06 PROCEDURE — 97597 DBRDMT OPN WND 1ST 20 CM/<: CPT | Performed by: PODIATRIST

## 2021-12-06 RX ORDER — CLOBETASOL PROPIONATE 0.5 MG/G
OINTMENT TOPICAL ONCE
Status: CANCELLED | OUTPATIENT
Start: 2021-12-06 | End: 2021-12-06

## 2021-12-06 RX ORDER — BACITRACIN ZINC AND POLYMYXIN B SULFATE 500; 1000 [USP'U]/G; [USP'U]/G
OINTMENT TOPICAL ONCE
Status: CANCELLED | OUTPATIENT
Start: 2021-12-06 | End: 2021-12-06

## 2021-12-06 RX ORDER — LIDOCAINE HYDROCHLORIDE 40 MG/ML
SOLUTION TOPICAL ONCE
Status: CANCELLED | OUTPATIENT
Start: 2021-12-06 | End: 2021-12-06

## 2021-12-06 RX ORDER — LIDOCAINE HYDROCHLORIDE 20 MG/ML
JELLY TOPICAL ONCE
Status: CANCELLED | OUTPATIENT
Start: 2021-12-06 | End: 2021-12-06

## 2021-12-06 RX ORDER — BACITRACIN, NEOMYCIN, POLYMYXIN B 400; 3.5; 5 [USP'U]/G; MG/G; [USP'U]/G
OINTMENT TOPICAL ONCE
Status: CANCELLED | OUTPATIENT
Start: 2021-12-06 | End: 2021-12-06

## 2021-12-06 RX ORDER — GINSENG 100 MG
CAPSULE ORAL ONCE
Status: CANCELLED | OUTPATIENT
Start: 2021-12-06 | End: 2021-12-06

## 2021-12-06 RX ORDER — LIDOCAINE 40 MG/G
CREAM TOPICAL ONCE
Status: CANCELLED | OUTPATIENT
Start: 2021-12-06 | End: 2021-12-06

## 2021-12-06 RX ORDER — BETAMETHASONE DIPROPIONATE 0.05 %
OINTMENT (GRAM) TOPICAL ONCE
Status: CANCELLED | OUTPATIENT
Start: 2021-12-06 | End: 2021-12-06

## 2021-12-06 RX ORDER — LIDOCAINE 50 MG/G
OINTMENT TOPICAL ONCE
Status: CANCELLED | OUTPATIENT
Start: 2021-12-06 | End: 2021-12-06

## 2021-12-06 RX ORDER — GENTAMICIN SULFATE 1 MG/G
OINTMENT TOPICAL ONCE
Status: CANCELLED | OUTPATIENT
Start: 2021-12-06 | End: 2021-12-06

## 2021-12-06 ASSESSMENT — PAIN SCALES - GENERAL: PAINLEVEL_OUTOF10: 0

## 2021-12-06 NOTE — PLAN OF CARE
Problem: Wound:  Goal: Will show signs of wound healing; wound closure and no evidence of infection  Description: Will show signs of wound healing; wound closure and no evidence of infection  Outcome: Ongoing     Problem: Venous:  Goal: Signs of wound healing will improve  Description: Signs of wound healing will improve  Outcome: Ongoing     Problem: Smoking cessation:  Goal: Ability to formulate a plan to maintain a tobacco-free life will be supported  Description: Ability to formulate a plan to maintain a tobacco-free life will be supported  Outcome: Ongoing     Problem: Compression therapy:  Goal: Will be free from complications associated with compression therapy  Description: Will be free from complications associated with compression therapy  Outcome: Ongoing     Problem: Weight control:  Goal: Ability to maintain an optimal weight for height and age will be supported  Description: Ability to maintain an optimal weight for height and age will be supported  Outcome: Ongoing
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
## 2021-12-13 ENCOUNTER — HOSPITAL ENCOUNTER (OUTPATIENT)
Dept: WOUND CARE | Age: 71
Discharge: HOME | End: 2021-12-14
Payer: COMMERCIAL

## 2021-12-13 VITALS
HEIGHT: 68 IN | TEMPERATURE: 97.5 F | HEART RATE: 84 BPM | BODY MASS INDEX: 22.91 KG/M2 | RESPIRATION RATE: 20 BRPM | SYSTOLIC BLOOD PRESSURE: 128 MMHG | WEIGHT: 151.2 LBS | DIASTOLIC BLOOD PRESSURE: 80 MMHG

## 2021-12-13 DIAGNOSIS — L97.512 ULCER OF RIGHT FOOT WITH FAT LAYER EXPOSED (HCC): Primary | ICD-10-CM

## 2021-12-13 DIAGNOSIS — I87.2 CHRONIC VENOUS INSUFFICIENCY: ICD-10-CM

## 2021-12-13 PROCEDURE — 97597 DBRDMT OPN WND 1ST 20 CM/<: CPT | Performed by: PODIATRIST

## 2021-12-13 RX ORDER — BACITRACIN ZINC AND POLYMYXIN B SULFATE 500; 1000 [USP'U]/G; [USP'U]/G
OINTMENT TOPICAL ONCE
OUTPATIENT
Start: 2021-12-13 | End: 2021-12-13

## 2021-12-13 RX ORDER — BACITRACIN, NEOMYCIN, POLYMYXIN B 400; 3.5; 5 [USP'U]/G; MG/G; [USP'U]/G
OINTMENT TOPICAL ONCE
OUTPATIENT
Start: 2021-12-13 | End: 2021-12-13

## 2021-12-13 RX ORDER — LIDOCAINE HYDROCHLORIDE 20 MG/ML
JELLY TOPICAL ONCE
OUTPATIENT
Start: 2021-12-13 | End: 2021-12-13

## 2021-12-13 RX ORDER — LIDOCAINE HYDROCHLORIDE 40 MG/ML
SOLUTION TOPICAL ONCE
OUTPATIENT
Start: 2021-12-13 | End: 2021-12-13

## 2021-12-13 RX ORDER — CLOBETASOL PROPIONATE 0.5 MG/G
OINTMENT TOPICAL ONCE
OUTPATIENT
Start: 2021-12-13 | End: 2021-12-13

## 2021-12-13 RX ORDER — LIDOCAINE 40 MG/G
CREAM TOPICAL ONCE
OUTPATIENT
Start: 2021-12-13 | End: 2021-12-13

## 2021-12-13 RX ORDER — BETAMETHASONE DIPROPIONATE 0.05 %
OINTMENT (GRAM) TOPICAL ONCE
OUTPATIENT
Start: 2021-12-13 | End: 2021-12-13

## 2021-12-13 RX ORDER — GENTAMICIN SULFATE 1 MG/G
OINTMENT TOPICAL ONCE
OUTPATIENT
Start: 2021-12-13 | End: 2021-12-13

## 2021-12-13 RX ORDER — GINSENG 100 MG
CAPSULE ORAL ONCE
OUTPATIENT
Start: 2021-12-13 | End: 2021-12-13

## 2021-12-13 RX ORDER — LIDOCAINE 50 MG/G
OINTMENT TOPICAL ONCE
OUTPATIENT
Start: 2021-12-13 | End: 2021-12-13

## 2021-12-13 NOTE — PLAN OF CARE
Problem: Wound:  Goal: Will show signs of wound healing; wound closure and no evidence of infection  Description: Will show signs of wound healing; wound closure and no evidence of infection  Outcome: Ongoing     Problem: Venous:  Goal: Signs of wound healing will improve  Description: Signs of wound healing will improve  Outcome: Ongoing     Problem: Smoking cessation:  Goal: Ability to formulate a plan to maintain a tobacco-free life will be supported  Description: Ability to formulate a plan to maintain a tobacco-free life will be supported  Outcome: Ongoing     Problem: Compression therapy:  Goal: Will be free from complications associated with compression therapy  Description: Will be free from complications associated with compression therapy  Outcome: Ongoing     Problem: Falls - Risk of:  Goal: Will remain free from falls  Description: Will remain free from falls  Outcome: Ongoing
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
## 2021-12-13 NOTE — PROGRESS NOTES
Dr. Kendra Humphreys ordered mid-week dressing change for patient. Patient states he will call clinic to set appt up due to not knowing his work schedule this week. Educated patient the importance of the dressing change to help wound healing. Patient verbalized understanding.

## 2021-12-20 ENCOUNTER — HOSPITAL ENCOUNTER (OUTPATIENT)
Dept: WOUND CARE | Age: 71
Discharge: HOME | End: 2021-12-21
Payer: COMMERCIAL

## 2021-12-20 ENCOUNTER — TELEPHONE (OUTPATIENT)
Dept: WOUND CARE | Age: 71
End: 2021-12-20

## 2021-12-20 VITALS
HEIGHT: 68 IN | TEMPERATURE: 98.8 F | WEIGHT: 153 LBS | HEART RATE: 77 BPM | DIASTOLIC BLOOD PRESSURE: 80 MMHG | BODY MASS INDEX: 23.19 KG/M2 | RESPIRATION RATE: 20 BRPM | SYSTOLIC BLOOD PRESSURE: 136 MMHG

## 2021-12-20 DIAGNOSIS — L97.512 ULCER OF RIGHT FOOT WITH FAT LAYER EXPOSED (HCC): Primary | ICD-10-CM

## 2021-12-20 DIAGNOSIS — I87.2 CHRONIC VENOUS INSUFFICIENCY: ICD-10-CM

## 2021-12-20 PROCEDURE — 97597 DBRDMT OPN WND 1ST 20 CM/<: CPT | Performed by: PODIATRIST

## 2021-12-20 RX ORDER — LIDOCAINE 50 MG/G
OINTMENT TOPICAL ONCE
OUTPATIENT
Start: 2021-12-20 | End: 2021-12-20

## 2021-12-20 RX ORDER — LIDOCAINE HYDROCHLORIDE 20 MG/ML
JELLY TOPICAL ONCE
OUTPATIENT
Start: 2021-12-20 | End: 2021-12-20

## 2021-12-20 RX ORDER — BACITRACIN ZINC AND POLYMYXIN B SULFATE 500; 1000 [USP'U]/G; [USP'U]/G
OINTMENT TOPICAL ONCE
OUTPATIENT
Start: 2021-12-20 | End: 2021-12-20

## 2021-12-20 RX ORDER — GINSENG 100 MG
CAPSULE ORAL ONCE
OUTPATIENT
Start: 2021-12-20 | End: 2021-12-20

## 2021-12-20 RX ORDER — LIDOCAINE HYDROCHLORIDE 40 MG/ML
SOLUTION TOPICAL ONCE
OUTPATIENT
Start: 2021-12-20 | End: 2021-12-20

## 2021-12-20 RX ORDER — BACITRACIN, NEOMYCIN, POLYMYXIN B 400; 3.5; 5 [USP'U]/G; MG/G; [USP'U]/G
OINTMENT TOPICAL ONCE
OUTPATIENT
Start: 2021-12-20 | End: 2021-12-20

## 2021-12-20 RX ORDER — GENTAMICIN SULFATE 1 MG/G
OINTMENT TOPICAL ONCE
OUTPATIENT
Start: 2021-12-20 | End: 2021-12-20

## 2021-12-20 RX ORDER — BETAMETHASONE DIPROPIONATE 0.05 %
OINTMENT (GRAM) TOPICAL ONCE
OUTPATIENT
Start: 2021-12-20 | End: 2021-12-20

## 2021-12-20 RX ORDER — CLOBETASOL PROPIONATE 0.5 MG/G
OINTMENT TOPICAL ONCE
OUTPATIENT
Start: 2021-12-20 | End: 2021-12-20

## 2021-12-20 RX ORDER — LIDOCAINE 40 MG/G
CREAM TOPICAL ONCE
OUTPATIENT
Start: 2021-12-20 | End: 2021-12-20

## 2021-12-20 ASSESSMENT — PAIN SCALES - GENERAL: PAINLEVEL_OUTOF10: 0

## 2021-12-20 NOTE — TELEPHONE ENCOUNTER
Patients wife Angie Madrid called office stating patient is no longer going on 3 week trip to [place] and needs a f/u appointment. Angie Madrid also would like to talk to a wound nurse regarding patients current treatment. Call back # [phone].
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
## 2022-01-03 ENCOUNTER — TELEPHONE (OUTPATIENT)
Dept: WOUND CARE | Age: 72
End: 2022-01-03

## 2022-01-03 NOTE — TELEPHONE ENCOUNTER
Patient no showed appointment with Dr. Bailey Renee today at 9:00 am. Writer left message on patient's voicemail with clinic phone number to reschedule appointment. No show letter mailed.

## 2022-07-04 NOTE — TELEPHONE ENCOUNTER
SBAR report given at bedside to GLADYS Álvarez  []  Patient's and  proceduralist/s name  Miguel Bach  []  Procedures performed EGD and BIOPSY  []  Specimens obtained Yes  []  Anesthesia provider, type and tolerance  Dr Aspen KERN sedation  []  Code Status verification Full  []  Reversal Agent Given: No  []  Patient back to baseline: Yes  []  Post-Procedure monitoring discharge criteria met Yes  []  Modified Gisela score on hand off is: 9, patient's baseline before procedure.  []  Receiving RN aware of appropriate follow up monitoring that is required.  Surgical vitals requested.  []  Medical/social history including allergies  []  Unusual events during the procedure NA  []  Last vital signs  141/74  86 HR  97% on room air  19 RR  []  Prior comfort/pain management interventions  Medon provided.  []  Review post op orders  Patient can be regular diet per Dr Bach.  Octreotide gtt and protonix gtt to be discontinued.  []  Personal belongings NA  []  Other assessment findings NA            Lm to call clinic back, follow up appt made for patient 1/3/21

## 2022-11-08 NOTE — PROGRESS NOTES
Apligraf Treatment Note    NAME:  Delfino Nava  YOB: 1950  MEDICAL RECORD NUMBER:  [id number]  DATE:  10/25/2021    Goal: Patient will receive safe and proper application of skin substitute. Patient will comply with caring for dressing, offloading and reporting complications. [x]Expiration date and pH of Apligraf checked immediately prior to use. [x] Package intact prior to use and no damage noted. [x] Transport temperature controlled and acceptable. Apligraf was removed from protective sterile packaging by physician and applied to prepared wound bed. Apligraf was placed dermal side down onto the wound bed. Apligraf was applied to right ankle/foot and affixed with steri-strips by the physician. [x] Apligraf was covered with non-adherent ulcer dressing. [x] Applied optilock and unna boot  over non-adherent. [x] Applied dry gauze and/or roll gauze. [x] Coban or ace wrap was applied to secure graft and decrease edema. Patient/caregiver was instructed not to remove dressing and to keep it clean         and dry. Pt/family/caregiver was instructed on signs and symptoms of complications to report such as draining through dressing, dressing falling down/slipping,getting wet, or severe pain or tingling in toes   Pt/family/caregiver was instructed on need for offloading and elevation of               affected extremity and on use of prescribed offloading device. Apligraf may be applied a total of 5 times per wound over a 12 week period. Date of first application of Apligraf for this current wound is October 11, 2021.     Application 3/5       Guidelines followed    Electronically signed by Stephanie Hill RN on 10/25/2021 at 11:28 AM (3) no apparent problem

## 2023-01-01 ENCOUNTER — APPOINTMENT (OUTPATIENT)
Dept: CT IMAGING | Age: 73
DRG: 064 | End: 2023-01-01
Payer: COMMERCIAL

## 2023-01-01 ENCOUNTER — APPOINTMENT (OUTPATIENT)
Dept: GENERAL RADIOLOGY | Age: 73
DRG: 064 | End: 2023-01-01
Payer: COMMERCIAL

## 2023-01-01 ENCOUNTER — HOSPITAL ENCOUNTER (INPATIENT)
Age: 73
LOS: 2 days | DRG: 064 | End: 2023-01-18
Attending: EMERGENCY MEDICINE | Admitting: PSYCHIATRY & NEUROLOGY
Payer: COMMERCIAL

## 2023-01-01 VITALS
BODY MASS INDEX: 20.89 KG/M2 | HEIGHT: 66 IN | DIASTOLIC BLOOD PRESSURE: 40 MMHG | TEMPERATURE: 99.9 F | HEART RATE: 86 BPM | WEIGHT: 130 LBS | OXYGEN SATURATION: 85 % | SYSTOLIC BLOOD PRESSURE: 72 MMHG | RESPIRATION RATE: 19 BRPM

## 2023-01-01 DIAGNOSIS — I62.9 INTRACRANIAL BLEEDING (HCC): Primary | ICD-10-CM

## 2023-01-01 LAB
ABO/RH: NORMAL
ABSOLUTE EOS #: 0 K/UL (ref 0–0.4)
ABSOLUTE EOS #: 1.66 K/UL (ref 0–0.4)
ABSOLUTE IMMATURE GRANULOCYTE: 12.8 K/UL (ref 0–0.3)
ABSOLUTE IMMATURE GRANULOCYTE: 33.18 K/UL (ref 0–0.3)
ABSOLUTE LYMPH #: 4.66 K/UL (ref 1–4.8)
ABSOLUTE LYMPH #: 9.95 K/UL (ref 1–4.8)
ABSOLUTE MONO #: 38.16 K/UL (ref 0.1–0.8)
ABSOLUTE MONO #: 43.07 K/UL (ref 0.1–0.8)
ACETAMINOPHEN LEVEL: 8 UG/ML (ref 10–30)
ALLEN TEST: POSITIVE
AMPHETAMINE SCREEN URINE: NEGATIVE
ANION GAP SERPL CALCULATED.3IONS-SCNC: 11 MMOL/L (ref 9–17)
ANION GAP SERPL CALCULATED.3IONS-SCNC: 12 MMOL/L (ref 9–17)
ANION GAP: 13 MMOL/L (ref 7–16)
ANTIBODY SCREEN: NEGATIVE
ARM BAND NUMBER: NORMAL
BACTERIA: ABNORMAL
BARBITURATE SCREEN URINE: NEGATIVE
BASOPHILS # BLD: 0 % (ref 0–2)
BASOPHILS # BLD: 0 % (ref 0–2)
BASOPHILS ABSOLUTE: 0 K/UL (ref 0–0.2)
BASOPHILS ABSOLUTE: 0 K/UL (ref 0–0.2)
BENZODIAZEPINE SCREEN, URINE: POSITIVE
BILIRUBIN URINE: NEGATIVE
BLASTS ABSOLUTE COUNT: 23.28 K/UL
BLASTS ABSOLUTE COUNT: 34.84 K/UL
BLASTS: 20 %
BLASTS: 21 %
BLD PROD TYP BPU: NORMAL
BLOOD BANK BLOOD PRODUCT EXPIRATION DATE: NORMAL
BLOOD BANK ISBT PRODUCT BLOOD TYPE: 5100
BLOOD BANK ISBT PRODUCT BLOOD TYPE: 6200
BLOOD BANK ISBT PRODUCT BLOOD TYPE: 7300
BLOOD BANK PRODUCT CODE: NORMAL
BLOOD BANK UNIT TYPE AND RH: NORMAL
BPU ID: NORMAL
BUN BLDV-MCNC: 32 MG/DL (ref 8–23)
BUN BLDV-MCNC: 40 MG/DL (ref 8–23)
C-REACTIVE PROTEIN: 14.7 MG/L (ref 0–5)
CALCIUM IONIZED: 1.08 MMOL/L (ref 1.13–1.33)
CALCIUM SERPL-MCNC: 7 MG/DL (ref 8.6–10.4)
CALCIUM SERPL-MCNC: 7.7 MG/DL (ref 8.6–10.4)
CANNABINOID SCREEN URINE: NEGATIVE
CASTS UA: ABNORMAL /LPF (ref 0–2)
CASTS UA: ABNORMAL /LPF (ref 0–2)
CHLORIDE BLD-SCNC: 105 MMOL/L (ref 98–107)
CHLORIDE BLD-SCNC: 116 MMOL/L (ref 98–107)
CO2: 17 MMOL/L (ref 20–31)
CO2: 19 MMOL/L (ref 20–31)
COCAINE METABOLITE, URINE: NEGATIVE
COLOR: YELLOW
CREAT SERPL-MCNC: 1.86 MG/DL (ref 0.7–1.2)
CREAT SERPL-MCNC: 2.2 MG/DL (ref 0.7–1.2)
CROSSMATCH RESULT: NORMAL
CULTURE: NORMAL
DISPENSE STATUS BLOOD BANK: NORMAL
EGFR, POC: 29 ML/MIN/1.73M2
EKG ATRIAL RATE: 68 BPM
EKG P AXIS: 68 DEGREES
EKG P-R INTERVAL: 176 MS
EKG Q-T INTERVAL: 480 MS
EKG QRS DURATION: 112 MS
EKG QTC CALCULATION (BAZETT): 510 MS
EKG R AXIS: 66 DEGREES
EKG T AXIS: 59 DEGREES
EKG VENTRICULAR RATE: 68 BPM
EOSINOPHILS RELATIVE PERCENT: 0 % (ref 1–4)
EOSINOPHILS RELATIVE PERCENT: 1 % (ref 1–4)
EPITHELIAL CELLS UA: ABNORMAL /HPF (ref 0–5)
ESTIMATED AVERAGE GLUCOSE: 114 MG/DL
ETHANOL PERCENT: <0.01 %
ETHANOL: <10 MG/DL
EXPIRATION DATE: NORMAL
FDP: >5 UG/ML
FENTANYL URINE: NEGATIVE
FIBRINOGEN: 239 MG/DL (ref 140–420)
FIO2: 60
GFR SERPL CREATININE-BSD FRML MDRD: 31 ML/MIN/1.73M2
GFR SERPL CREATININE-BSD FRML MDRD: 38 ML/MIN/1.73M2
GLUCOSE BLD-MCNC: 119 MG/DL (ref 74–100)
GLUCOSE BLD-MCNC: 123 MG/DL (ref 74–100)
GLUCOSE BLD-MCNC: 134 MG/DL (ref 70–99)
GLUCOSE BLD-MCNC: 138 MG/DL (ref 70–99)
GLUCOSE BLD-MCNC: 139 MG/DL (ref 74–100)
GLUCOSE URINE: NEGATIVE
HBA1C MFR BLD: 5.6 % (ref 4–6)
HCO3 VENOUS: 20 MMOL/L (ref 22–29)
HCT VFR BLD CALC: 12.2 % (ref 40.7–50.3)
HCT VFR BLD CALC: 15.4 % (ref 40.7–50.3)
HCT VFR BLD CALC: 17.9 % (ref 40.7–50.3)
HCT VFR BLD CALC: 18.7 % (ref 40.7–50.3)
HCT VFR BLD CALC: 19 % (ref 40.7–50.3)
HCT VFR BLD CALC: 20.3 % (ref 40.7–50.3)
HCT VFR BLD CALC: 22.3 % (ref 40.7–50.3)
HEMOGLOBIN: 3.2 G/DL (ref 13–17)
HEMOGLOBIN: 4.4 G/DL (ref 13–17)
HEMOGLOBIN: 5.4 G/DL (ref 13–17)
HEMOGLOBIN: 5.7 G/DL (ref 13–17)
HEMOGLOBIN: 5.8 G/DL (ref 13–17)
HEMOGLOBIN: 6.2 G/DL (ref 13–17)
HEMOGLOBIN: 6.8 G/DL (ref 13–17)
IMMATURE GRANULOCYTES: 11 %
IMMATURE GRANULOCYTES: 20 %
INR BLD: 1.2
INR BLD: 1.3
KETONES, URINE: NEGATIVE
LACTIC ACID, WHOLE BLOOD: 2 MMOL/L (ref 0.7–2.1)
LEUKOCYTE ESTERASE, URINE: ABNORMAL
LYMPHOCYTES # BLD: 4 % (ref 24–44)
LYMPHOCYTES # BLD: 6 % (ref 24–44)
MAGNESIUM: 1.6 MG/DL (ref 1.6–2.6)
MCH RBC QN AUTO: 27.4 PG (ref 25.2–33.5)
MCH RBC QN AUTO: 28.7 PG (ref 25.2–33.5)
MCH RBC QN AUTO: 28.8 PG (ref 25.2–33.5)
MCH RBC QN AUTO: 28.9 PG (ref 25.2–33.5)
MCH RBC QN AUTO: 29.3 PG (ref 25.2–33.5)
MCHC RBC AUTO-ENTMCNC: 26.2 G/DL (ref 28.4–34.8)
MCHC RBC AUTO-ENTMCNC: 28.6 G/DL (ref 28.4–34.8)
MCHC RBC AUTO-ENTMCNC: 30.2 G/DL (ref 28.4–34.8)
MCHC RBC AUTO-ENTMCNC: 30.5 G/DL (ref 28.4–34.8)
MCHC RBC AUTO-ENTMCNC: 30.5 G/DL (ref 28.4–34.8)
MCV RBC AUTO: 100.7 FL (ref 82.6–102.9)
MCV RBC AUTO: 104.3 FL (ref 82.6–102.9)
MCV RBC AUTO: 94.9 FL (ref 82.6–102.9)
MCV RBC AUTO: 95.2 FL (ref 82.6–102.9)
MCV RBC AUTO: 96 FL (ref 82.6–102.9)
METHADONE SCREEN, URINE: NEGATIVE
MODE: ABNORMAL
MONOCYTES # BLD: 23 % (ref 1–7)
MONOCYTES # BLD: 37 % (ref 1–7)
MORPHOLOGY: ABNORMAL
MYOGLOBIN: 93 NG/ML (ref 28–72)
NEGATIVE BASE EXCESS, ART: 4 (ref 0–2)
NEGATIVE BASE EXCESS, ART: 6 (ref 0–2)
NEGATIVE BASE EXCESS, VEN: 7 (ref 0–2)
NITRITE, URINE: POSITIVE
NRBC AUTOMATED: 3.5 PER 100 WBC
NRBC AUTOMATED: 3.6 PER 100 WBC
NRBC AUTOMATED: 4 PER 100 WBC
NRBC AUTOMATED: 4.2 PER 100 WBC
NRBC AUTOMATED: 4.3 PER 100 WBC
NUCLEATED RED BLOOD CELLS: 4 PER 100 WBC
NUCLEATED RED BLOOD CELLS: 6 PER 100 WBC
O2 DEVICE/FLOW/%: ABNORMAL
O2 SAT, VEN: 51 % (ref 60–85)
OPIATES, URINE: NEGATIVE
OXYCODONE SCREEN URINE: NEGATIVE
PARTIAL THROMBOPLASTIN TIME: 22.9 SEC (ref 20.5–30.5)
PCO2, VEN: 45.7 MM HG (ref 41–51)
PDW BLD-RTO: 18.4 % (ref 11.8–14.4)
PDW BLD-RTO: 18.7 % (ref 11.8–14.4)
PDW BLD-RTO: 18.7 % (ref 11.8–14.4)
PDW BLD-RTO: 20.7 % (ref 11.8–14.4)
PDW BLD-RTO: 26.3 % (ref 11.8–14.4)
PH UA: 5.5 (ref 5–8)
PH VENOUS: 7.25 (ref 7.32–7.43)
PHENCYCLIDINE, URINE: NEGATIVE
PHOSPHORUS: 4.3 MG/DL (ref 2.5–4.5)
PLATELET # BLD: ABNORMAL K/UL (ref 138–453)
PLATELET, FLUORESCENCE: 16 K/UL (ref 138–453)
PLATELET, FLUORESCENCE: 18 K/UL (ref 138–453)
PLATELET, FLUORESCENCE: 19 K/UL (ref 138–453)
PLATELET, FLUORESCENCE: 22 K/UL (ref 138–453)
PLATELET, FLUORESCENCE: 25 K/UL (ref 138–453)
PLATELET, IMMATURE FRACTION: 2.8 % (ref 1.1–10.3)
PLATELET, IMMATURE FRACTION: 3 % (ref 1.1–10.3)
PLATELET, IMMATURE FRACTION: 3.8 % (ref 1.1–10.3)
PLATELET, IMMATURE FRACTION: 4.2 % (ref 1.1–10.3)
PLATELET, IMMATURE FRACTION: 4.9 % (ref 1.1–10.3)
PO2, VEN: 31.8 MM HG (ref 30–50)
POC BUN: 44 MG/DL (ref 8–26)
POC CHLORIDE: 107 MMOL/L (ref 98–107)
POC CREATININE: 2.38 MG/DL (ref 0.51–1.19)
POC HCO3: 20.4 MMOL/L (ref 21–28)
POC HCO3: 21.4 MMOL/L (ref 21–28)
POC HEMATOCRIT: 18 % (ref 41–53)
POC HEMOGLOBIN: 6.2 G/DL (ref 13.5–17.5)
POC IONIZED CALCIUM: 1.14 MMOL/L (ref 1.15–1.33)
POC LACTIC ACID: 1.53 MMOL/L (ref 0.56–1.39)
POC LACTIC ACID: 1.68 MMOL/L (ref 0.56–1.39)
POC O2 SATURATION: 100 % (ref 94–98)
POC O2 SATURATION: 18 % (ref 94–98)
POC PCO2: 36.7 MM HG (ref 35–48)
POC PCO2: 44.2 MM HG (ref 35–48)
POC PH: 7.27 (ref 7.35–7.45)
POC PH: 7.37 (ref 7.35–7.45)
POC PO2: 16.6 MM HG (ref 83–108)
POC PO2: 201.4 MM HG (ref 83–108)
POC POTASSIUM: 4.2 MMOL/L (ref 3.5–4.5)
POC SODIUM: 138 MMOL/L (ref 138–146)
POC TCO2: 20 MMOL/L (ref 22–30)
POTASSIUM SERPL-SCNC: 4.1 MMOL/L (ref 3.7–5.3)
POTASSIUM SERPL-SCNC: 4.2 MMOL/L (ref 3.7–5.3)
PROTEIN UA: ABNORMAL
PROTHROMBIN TIME: 12.3 SEC (ref 9.1–12.3)
PROTHROMBIN TIME: 13.2 SEC (ref 9.1–12.3)
RBC # BLD: 1.17 M/UL (ref 4.21–5.77)
RBC # BLD: 1.53 M/UL (ref 4.21–5.77)
RBC # BLD: 1.88 M/UL (ref 4.21–5.77)
RBC # BLD: 1.98 M/UL (ref 4.21–5.77)
RBC # BLD: 2.35 M/UL (ref 4.21–5.77)
RBC UA: ABNORMAL /HPF (ref 0–2)
SALICYLATE LEVEL: <1 MG/DL (ref 3–10)
SAMPLE SITE: ABNORMAL
SEDIMENTATION RATE, ERYTHROCYTE: 10 MM/HR (ref 0–20)
SEG NEUTROPHILS: 28 % (ref 36–66)
SEG NEUTROPHILS: 29 % (ref 36–66)
SEGMENTED NEUTROPHILS ABSOLUTE COUNT: 32.59 K/UL (ref 1.8–7.7)
SEGMENTED NEUTROPHILS ABSOLUTE COUNT: 48.11 K/UL (ref 1.8–7.7)
SODIUM BLD-SCNC: 131 MMOL/L (ref 135–144)
SODIUM BLD-SCNC: 135 MMOL/L (ref 135–144)
SODIUM BLD-SCNC: 136 MMOL/L (ref 135–144)
SODIUM BLD-SCNC: 139 MMOL/L (ref 135–144)
SODIUM BLD-SCNC: 141 MMOL/L (ref 135–144)
SODIUM BLD-SCNC: 145 MMOL/L (ref 135–144)
SODIUM BLD-SCNC: 147 MMOL/L (ref 135–144)
SPECIFIC GRAVITY UA: 1.02 (ref 1–1.03)
SPECIMEN DESCRIPTION: NORMAL
TEST INFORMATION: ABNORMAL
TOTAL CK: 52 U/L (ref 39–308)
TOXIC TRICYCLIC SC,BLOOD: NEGATIVE
TRANSFUSION STATUS: NORMAL
TROPONIN, HIGH SENSITIVITY: 22 NG/L (ref 0–22)
TURBIDITY: ABNORMAL
UNIT DIVISION: 0
UNIT ISSUE DATE/TIME: NORMAL
URIC ACID: 10.1 MG/DL (ref 3.4–7)
URINE HGB: ABNORMAL
UROBILINOGEN, URINE: NORMAL
WBC # BLD: 114.4 K/UL (ref 3.5–11.3)
WBC # BLD: 116.4 K/UL (ref 3.5–11.3)
WBC # BLD: 125.9 K/UL (ref 3.5–11.3)
WBC # BLD: 130.3 K/UL (ref 3.5–11.3)
WBC # BLD: 165.9 K/UL (ref 3.5–11.3)
WBC UA: ABNORMAL /HPF (ref 0–5)
YEAST: ABNORMAL

## 2023-01-01 PROCEDURE — 80179 DRUG ASSAY SALICYLATE: CPT

## 2023-01-01 PROCEDURE — 6370000000 HC RX 637 (ALT 250 FOR IP): Performed by: STUDENT IN AN ORGANIZED HEALTH CARE EDUCATION/TRAINING PROGRAM

## 2023-01-01 PROCEDURE — 6360000002 HC RX W HCPCS: Performed by: INTERNAL MEDICINE

## 2023-01-01 PROCEDURE — 86900 BLOOD TYPING SEROLOGIC ABO: CPT

## 2023-01-01 PROCEDURE — 74018 RADEX ABDOMEN 1 VIEW: CPT

## 2023-01-01 PROCEDURE — 6360000002 HC RX W HCPCS: Performed by: NURSE PRACTITIONER

## 2023-01-01 PROCEDURE — 6370000000 HC RX 637 (ALT 250 FOR IP): Performed by: NEUROLOGICAL SURGERY

## 2023-01-01 PROCEDURE — 99223 1ST HOSP IP/OBS HIGH 75: CPT | Performed by: INTERNAL MEDICINE

## 2023-01-01 PROCEDURE — 2500000003 HC RX 250 WO HCPCS

## 2023-01-01 PROCEDURE — 85610 PROTHROMBIN TIME: CPT

## 2023-01-01 PROCEDURE — 36430 TRANSFUSION BLD/BLD COMPNT: CPT

## 2023-01-01 PROCEDURE — 85652 RBC SED RATE AUTOMATED: CPT

## 2023-01-01 PROCEDURE — 71045 X-RAY EXAM CHEST 1 VIEW: CPT

## 2023-01-01 PROCEDURE — 2580000003 HC RX 258: Performed by: NEUROLOGICAL SURGERY

## 2023-01-01 PROCEDURE — 36600 WITHDRAWAL OF ARTERIAL BLOOD: CPT

## 2023-01-01 PROCEDURE — 84550 ASSAY OF BLOOD/URIC ACID: CPT

## 2023-01-01 PROCEDURE — A4216 STERILE WATER/SALINE, 10 ML: HCPCS | Performed by: NEUROLOGICAL SURGERY

## 2023-01-01 PROCEDURE — 80051 ELECTROLYTE PANEL: CPT

## 2023-01-01 PROCEDURE — 5A1945Z RESPIRATORY VENTILATION, 24-96 CONSECUTIVE HOURS: ICD-10-PCS | Performed by: PSYCHIATRY & NEUROLOGY

## 2023-01-01 PROCEDURE — 80048 BASIC METABOLIC PNL TOTAL CA: CPT

## 2023-01-01 PROCEDURE — 87086 URINE CULTURE/COLONY COUNT: CPT

## 2023-01-01 PROCEDURE — 80307 DRUG TEST PRSMV CHEM ANLYZR: CPT

## 2023-01-01 PROCEDURE — 80143 DRUG ASSAY ACETAMINOPHEN: CPT

## 2023-01-01 PROCEDURE — 82803 BLOOD GASES ANY COMBINATION: CPT

## 2023-01-01 PROCEDURE — 86140 C-REACTIVE PROTEIN: CPT

## 2023-01-01 PROCEDURE — 2700000000 HC OXYGEN THERAPY PER DAY

## 2023-01-01 PROCEDURE — 84100 ASSAY OF PHOSPHORUS: CPT

## 2023-01-01 PROCEDURE — 6360000002 HC RX W HCPCS

## 2023-01-01 PROCEDURE — 83036 HEMOGLOBIN GLYCOSYLATED A1C: CPT

## 2023-01-01 PROCEDURE — 85027 COMPLETE CBC AUTOMATED: CPT

## 2023-01-01 PROCEDURE — 2000000000 HC ICU R&B

## 2023-01-01 PROCEDURE — 85014 HEMATOCRIT: CPT

## 2023-01-01 PROCEDURE — 85362 FIBRIN DEGRADATION PRODUCTS: CPT

## 2023-01-01 PROCEDURE — 70450 CT HEAD/BRAIN W/O DYE: CPT

## 2023-01-01 PROCEDURE — 99232 SBSQ HOSP IP/OBS MODERATE 35: CPT | Performed by: INTERNAL MEDICINE

## 2023-01-01 PROCEDURE — 86850 RBC ANTIBODY SCREEN: CPT

## 2023-01-01 PROCEDURE — 2500000003 HC RX 250 WO HCPCS: Performed by: STUDENT IN AN ORGANIZED HEALTH CARE EDUCATION/TRAINING PROGRAM

## 2023-01-01 PROCEDURE — 86901 BLOOD TYPING SEROLOGIC RH(D): CPT

## 2023-01-01 PROCEDURE — 84484 ASSAY OF TROPONIN QUANT: CPT

## 2023-01-01 PROCEDURE — 85730 THROMBOPLASTIN TIME PARTIAL: CPT

## 2023-01-01 PROCEDURE — 93005 ELECTROCARDIOGRAM TRACING: CPT

## 2023-01-01 PROCEDURE — 82947 ASSAY GLUCOSE BLOOD QUANT: CPT

## 2023-01-01 PROCEDURE — 84295 ASSAY OF SERUM SODIUM: CPT

## 2023-01-01 PROCEDURE — 94761 N-INVAS EAR/PLS OXIMETRY MLT: CPT

## 2023-01-01 PROCEDURE — 94003 VENT MGMT INPAT SUBQ DAY: CPT

## 2023-01-01 PROCEDURE — 99221 1ST HOSP IP/OBS SF/LOW 40: CPT | Performed by: INTERNAL MEDICINE

## 2023-01-01 PROCEDURE — P9016 RBC LEUKOCYTES REDUCED: HCPCS

## 2023-01-01 PROCEDURE — 2500000003 HC RX 250 WO HCPCS: Performed by: NEUROLOGICAL SURGERY

## 2023-01-01 PROCEDURE — 6360000002 HC RX W HCPCS: Performed by: STUDENT IN AN ORGANIZED HEALTH CARE EDUCATION/TRAINING PROGRAM

## 2023-01-01 PROCEDURE — 82553 CREATINE MB FRACTION: CPT

## 2023-01-01 PROCEDURE — 82550 ASSAY OF CK (CPK): CPT

## 2023-01-01 PROCEDURE — 6360000004 HC RX CONTRAST MEDICATION: Performed by: STUDENT IN AN ORGANIZED HEALTH CARE EDUCATION/TRAINING PROGRAM

## 2023-01-01 PROCEDURE — 83874 ASSAY OF MYOGLOBIN: CPT

## 2023-01-01 PROCEDURE — P9073 PLATELETS PHERESIS PATH REDU: HCPCS

## 2023-01-01 PROCEDURE — 94002 VENT MGMT INPAT INIT DAY: CPT

## 2023-01-01 PROCEDURE — 85025 COMPLETE CBC W/AUTO DIFF WBC: CPT

## 2023-01-01 PROCEDURE — 83735 ASSAY OF MAGNESIUM: CPT

## 2023-01-01 PROCEDURE — APPSS30 APP SPLIT SHARED TIME 16-30 MINUTES: Performed by: PHYSICIAN ASSISTANT

## 2023-01-01 PROCEDURE — 85384 FIBRINOGEN ACTIVITY: CPT

## 2023-01-01 PROCEDURE — 30233N1 TRANSFUSION OF NONAUTOLOGOUS RED BLOOD CELLS INTO PERIPHERAL VEIN, PERCUTANEOUS APPROACH: ICD-10-PCS | Performed by: PSYCHIATRY & NEUROLOGY

## 2023-01-01 PROCEDURE — 2500000003 HC RX 250 WO HCPCS: Performed by: INTERNAL MEDICINE

## 2023-01-01 PROCEDURE — 93010 ELECTROCARDIOGRAM REPORT: CPT | Performed by: INTERNAL MEDICINE

## 2023-01-01 PROCEDURE — G0480 DRUG TEST DEF 1-7 CLASSES: HCPCS

## 2023-01-01 PROCEDURE — 30233R1 TRANSFUSION OF NONAUTOLOGOUS PLATELETS INTO PERIPHERAL VEIN, PERCUTANEOUS APPROACH: ICD-10-PCS | Performed by: PSYCHIATRY & NEUROLOGY

## 2023-01-01 PROCEDURE — 82330 ASSAY OF CALCIUM: CPT

## 2023-01-01 PROCEDURE — 85055 RETICULATED PLATELET ASSAY: CPT

## 2023-01-01 PROCEDURE — 2580000003 HC RX 258: Performed by: NURSE PRACTITIONER

## 2023-01-01 PROCEDURE — 81001 URINALYSIS AUTO W/SCOPE: CPT

## 2023-01-01 PROCEDURE — 1200000000 HC SEMI PRIVATE

## 2023-01-01 PROCEDURE — 87040 BLOOD CULTURE FOR BACTERIA: CPT

## 2023-01-01 PROCEDURE — 83605 ASSAY OF LACTIC ACID: CPT

## 2023-01-01 PROCEDURE — 2580000003 HC RX 258

## 2023-01-01 PROCEDURE — 37799 UNLISTED PX VASCULAR SURGERY: CPT

## 2023-01-01 PROCEDURE — 99291 CRITICAL CARE FIRST HOUR: CPT | Performed by: PSYCHIATRY & NEUROLOGY

## 2023-01-01 PROCEDURE — 03HY32Z INSERTION OF MONITORING DEVICE INTO UPPER ARTERY, PERCUTANEOUS APPROACH: ICD-10-PCS | Performed by: STUDENT IN AN ORGANIZED HEALTH CARE EDUCATION/TRAINING PROGRAM

## 2023-01-01 PROCEDURE — 72125 CT NECK SPINE W/O DYE: CPT

## 2023-01-01 PROCEDURE — 82565 ASSAY OF CREATININE: CPT

## 2023-01-01 PROCEDURE — 06HY33Z INSERTION OF INFUSION DEVICE INTO LOWER VEIN, PERCUTANEOUS APPROACH: ICD-10-PCS

## 2023-01-01 PROCEDURE — 86920 COMPATIBILITY TEST SPIN: CPT

## 2023-01-01 PROCEDURE — 2580000003 HC RX 258: Performed by: STUDENT IN AN ORGANIZED HEALTH CARE EDUCATION/TRAINING PROGRAM

## 2023-01-01 PROCEDURE — APPNB60 APP NON BILLABLE TIME 46-60 MINS: Performed by: NURSE PRACTITIONER

## 2023-01-01 PROCEDURE — 99223 1ST HOSP IP/OBS HIGH 75: CPT | Performed by: NEUROLOGICAL SURGERY

## 2023-01-01 PROCEDURE — 85018 HEMOGLOBIN: CPT

## 2023-01-01 PROCEDURE — 70498 CT ANGIOGRAPHY NECK: CPT

## 2023-01-01 PROCEDURE — 99285 EMERGENCY DEPT VISIT HI MDM: CPT

## 2023-01-01 PROCEDURE — 84520 ASSAY OF UREA NITROGEN: CPT

## 2023-01-01 RX ORDER — ACETAMINOPHEN 325 MG/1
650 TABLET ORAL EVERY 4 HOURS PRN
Status: DISCONTINUED | OUTPATIENT
Start: 2023-01-01 | End: 2023-01-01

## 2023-01-01 RX ORDER — LEVETIRACETAM 5 MG/ML
500 INJECTION INTRAVASCULAR EVERY 12 HOURS
Status: DISCONTINUED | OUTPATIENT
Start: 2023-01-01 | End: 2023-01-01 | Stop reason: SDUPTHER

## 2023-01-01 RX ORDER — MORPHINE SULFATE 4 MG/ML
4 INJECTION, SOLUTION INTRAMUSCULAR; INTRAVENOUS
Status: DISCONTINUED | OUTPATIENT
Start: 2023-01-01 | End: 2023-01-01

## 2023-01-01 RX ORDER — CHLORHEXIDINE GLUCONATE 0.12 MG/ML
15 RINSE ORAL 2 TIMES DAILY
Status: DISCONTINUED | OUTPATIENT
Start: 2023-01-01 | End: 2023-01-01

## 2023-01-01 RX ORDER — MORPHINE SULFATE 2 MG/ML
2 INJECTION, SOLUTION INTRAMUSCULAR; INTRAVENOUS
Status: DISCONTINUED | OUTPATIENT
Start: 2023-01-01 | End: 2023-01-01

## 2023-01-01 RX ORDER — ONDANSETRON 2 MG/ML
4 INJECTION INTRAMUSCULAR; INTRAVENOUS EVERY 6 HOURS PRN
Status: DISCONTINUED | OUTPATIENT
Start: 2023-01-01 | End: 2023-01-01

## 2023-01-01 RX ORDER — FENTANYL CITRATE 50 UG/ML
50 INJECTION, SOLUTION INTRAMUSCULAR; INTRAVENOUS ONCE
Status: COMPLETED | OUTPATIENT
Start: 2023-01-01 | End: 2023-01-01

## 2023-01-01 RX ORDER — LORAZEPAM 2 MG/ML
1 INJECTION INTRAMUSCULAR
Status: DISCONTINUED | OUTPATIENT
Start: 2023-01-01 | End: 2023-01-01

## 2023-01-01 RX ORDER — ACETAMINOPHEN 650 MG/1
650 SUPPOSITORY RECTAL EVERY 6 HOURS PRN
Status: DISCONTINUED | OUTPATIENT
Start: 2023-01-01 | End: 2023-01-01 | Stop reason: HOSPADM

## 2023-01-01 RX ORDER — LORAZEPAM 2 MG/ML
1 INJECTION INTRAMUSCULAR EVERY 4 HOURS PRN
Status: DISCONTINUED | OUTPATIENT
Start: 2023-01-01 | End: 2023-01-01 | Stop reason: HOSPADM

## 2023-01-01 RX ORDER — SODIUM CHLORIDE 9 MG/ML
INJECTION, SOLUTION INTRAVENOUS PRN
Status: DISCONTINUED | OUTPATIENT
Start: 2023-01-01 | End: 2023-01-01

## 2023-01-01 RX ORDER — GLYCOPYRROLATE 0.2 MG/ML
0.1 INJECTION INTRAMUSCULAR; INTRAVENOUS EVERY 8 HOURS PRN
Status: DISCONTINUED | OUTPATIENT
Start: 2023-01-01 | End: 2023-01-01 | Stop reason: HOSPADM

## 2023-01-01 RX ORDER — GLYCOPYRROLATE 0.2 MG/ML
0.2 INJECTION INTRAMUSCULAR; INTRAVENOUS EVERY 4 HOURS PRN
Status: DISCONTINUED | OUTPATIENT
Start: 2023-01-01 | End: 2023-01-01

## 2023-01-01 RX ORDER — LORAZEPAM 2 MG/ML
0.5 INJECTION INTRAMUSCULAR EVERY 4 HOURS PRN
Status: DISCONTINUED | OUTPATIENT
Start: 2023-01-01 | End: 2023-01-01 | Stop reason: HOSPADM

## 2023-01-01 RX ORDER — SODIUM CHLORIDE 234 MG/ML
30 INJECTION, SOLUTION INTRAVENOUS ONCE
Status: COMPLETED | OUTPATIENT
Start: 2023-01-01 | End: 2023-01-01

## 2023-01-01 RX ORDER — ONDANSETRON 4 MG/1
4 TABLET, ORALLY DISINTEGRATING ORAL EVERY 8 HOURS PRN
Status: DISCONTINUED | OUTPATIENT
Start: 2023-01-01 | End: 2023-01-01

## 2023-01-01 RX ORDER — LEVETIRACETAM 500 MG/5ML
1000 INJECTION, SOLUTION, CONCENTRATE INTRAVENOUS ONCE
Status: COMPLETED | OUTPATIENT
Start: 2023-01-01 | End: 2023-01-01

## 2023-01-01 RX ORDER — LORAZEPAM 2 MG/ML
1 INJECTION INTRAMUSCULAR EVERY 4 HOURS
Status: DISCONTINUED | OUTPATIENT
Start: 2023-01-01 | End: 2023-01-01

## 2023-01-01 RX ORDER — LEVETIRACETAM 500 MG/5ML
500 INJECTION, SOLUTION, CONCENTRATE INTRAVENOUS EVERY 12 HOURS
Status: DISCONTINUED | OUTPATIENT
Start: 2023-01-01 | End: 2023-01-01 | Stop reason: HOSPADM

## 2023-01-01 RX ORDER — LEVETIRACETAM 10 MG/ML
1000 INJECTION INTRAVASCULAR ONCE
Status: DISCONTINUED | OUTPATIENT
Start: 2023-01-01 | End: 2023-01-01 | Stop reason: SDUPTHER

## 2023-01-01 RX ORDER — SODIUM CHLORIDE 9 MG/ML
INJECTION, SOLUTION INTRAVENOUS CONTINUOUS
Status: DISCONTINUED | OUTPATIENT
Start: 2023-01-01 | End: 2023-01-01

## 2023-01-01 RX ORDER — MANNITOL 20 G/100ML
INJECTION, SOLUTION INTRAVENOUS
Status: COMPLETED
Start: 2023-01-01 | End: 2023-01-01

## 2023-01-01 RX ORDER — LORAZEPAM 2 MG/ML
0.5 INJECTION INTRAMUSCULAR
Status: DISCONTINUED | OUTPATIENT
Start: 2023-01-01 | End: 2023-01-01

## 2023-01-01 RX ORDER — NOREPINEPHRINE BIT/0.9 % NACL 16MG/250ML
1-100 INFUSION BOTTLE (ML) INTRAVENOUS CONTINUOUS
Status: DISCONTINUED | OUTPATIENT
Start: 2023-01-01 | End: 2023-01-01

## 2023-01-01 RX ORDER — MANNITOL 20 G/100ML
1 INJECTION, SOLUTION INTRAVENOUS ONCE
Status: COMPLETED | OUTPATIENT
Start: 2023-01-01 | End: 2023-01-01

## 2023-01-01 RX ORDER — ACETAMINOPHEN 325 MG/1
650 TABLET ORAL EVERY 4 HOURS PRN
Status: DISCONTINUED | OUTPATIENT
Start: 2023-01-01 | End: 2023-01-01 | Stop reason: SDUPTHER

## 2023-01-01 RX ORDER — 3% SODIUM CHLORIDE 3 G/100ML
50 INJECTION, SOLUTION INTRAVENOUS CONTINUOUS
Status: DISCONTINUED | OUTPATIENT
Start: 2023-01-01 | End: 2023-01-01

## 2023-01-01 RX ORDER — LEVETIRACETAM 10 MG/ML
INJECTION INTRAVASCULAR
Status: COMPLETED
Start: 2023-01-01 | End: 2023-01-01

## 2023-01-01 RX ADMIN — MANNITOL 59 G: 20 INJECTION, SOLUTION INTRAVENOUS at 11:30

## 2023-01-01 RX ADMIN — Medication 60 MG: at 08:04

## 2023-01-01 RX ADMIN — ACETAMINOPHEN 650 MG: 325 TABLET ORAL at 01:15

## 2023-01-01 RX ADMIN — IOPAMIDOL 90 ML: 755 INJECTION, SOLUTION INTRAVENOUS at 10:38

## 2023-01-01 RX ADMIN — HYDROMORPHONE HYDROCHLORIDE 1 MG: 1 INJECTION, SOLUTION INTRAMUSCULAR; INTRAVENOUS; SUBCUTANEOUS at 12:47

## 2023-01-01 RX ADMIN — HYDROMORPHONE HYDROCHLORIDE 1 MG: 1 INJECTION, SOLUTION INTRAMUSCULAR; INTRAVENOUS; SUBCUTANEOUS at 00:41

## 2023-01-01 RX ADMIN — LORAZEPAM 0.5 MG: 2 INJECTION INTRAMUSCULAR; INTRAVENOUS at 21:11

## 2023-01-01 RX ADMIN — Medication 5 MCG/MIN: at 20:48

## 2023-01-01 RX ADMIN — SODIUM CHLORIDE 50 ML/HR: 3 INJECTION, SOLUTION INTRAVENOUS at 12:28

## 2023-01-01 RX ADMIN — HYDROMORPHONE HYDROCHLORIDE 1 MG: 1 INJECTION, SOLUTION INTRAMUSCULAR; INTRAVENOUS; SUBCUTANEOUS at 11:57

## 2023-01-01 RX ADMIN — Medication 60 MG: at 17:24

## 2023-01-01 RX ADMIN — VASOPRESSIN 120 MEQ: 20 INJECTION, SOLUTION INTRAVENOUS at 12:11

## 2023-01-01 RX ADMIN — SODIUM CHLORIDE: 9 INJECTION, SOLUTION INTRAVENOUS at 08:43

## 2023-01-01 RX ADMIN — Medication 60 MG: at 00:15

## 2023-01-01 RX ADMIN — LEVETIRACETAM 500 MG: 100 INJECTION, SOLUTION, CONCENTRATE INTRAVENOUS at 02:30

## 2023-01-01 RX ADMIN — LEVETIRACETAM 500 MG: 100 INJECTION, SOLUTION, CONCENTRATE INTRAVENOUS at 13:44

## 2023-01-01 RX ADMIN — LORAZEPAM 1 MG: 2 INJECTION INTRAMUSCULAR at 12:30

## 2023-01-01 RX ADMIN — SODIUM CHLORIDE, PRESERVATIVE FREE 20 MG: 5 INJECTION INTRAVENOUS at 17:24

## 2023-01-01 RX ADMIN — Medication 60 MG: at 04:42

## 2023-01-01 RX ADMIN — LEVETIRACETAM 1000 MG: 100 INJECTION, SOLUTION INTRAVENOUS at 15:12

## 2023-01-01 RX ADMIN — HYDROMORPHONE HYDROCHLORIDE 1 MG: 1 INJECTION, SOLUTION INTRAMUSCULAR; INTRAVENOUS; SUBCUTANEOUS at 19:48

## 2023-01-01 RX ADMIN — LORAZEPAM 0.5 MG: 2 INJECTION INTRAMUSCULAR; INTRAVENOUS at 11:54

## 2023-01-01 RX ADMIN — CHLORHEXIDINE GLUCONATE 15 ML: 1.2 RINSE ORAL at 14:20

## 2023-01-01 RX ADMIN — CHLORHEXIDINE GLUCONATE 15 ML: 1.2 RINSE ORAL at 20:35

## 2023-01-01 RX ADMIN — CEFTRIAXONE SODIUM 1000 MG: 1 INJECTION, POWDER, FOR SOLUTION INTRAMUSCULAR; INTRAVENOUS at 12:29

## 2023-01-01 RX ADMIN — CHLORHEXIDINE GLUCONATE 15 ML: 1.2 RINSE ORAL at 09:05

## 2023-01-01 RX ADMIN — Medication 60 MG: at 20:30

## 2023-01-01 RX ADMIN — LEVETIRACETAM 500 MG: 100 INJECTION, SOLUTION, CONCENTRATE INTRAVENOUS at 14:27

## 2023-01-01 RX ADMIN — FENTANYL CITRATE 50 MCG: 50 INJECTION, SOLUTION INTRAMUSCULAR; INTRAVENOUS at 14:30

## 2023-01-01 RX ADMIN — Medication 4 MG/HR: at 10:30

## 2023-01-01 RX ADMIN — GLYCOPYRROLATE 0.1 MG: 0.2 INJECTION INTRAMUSCULAR; INTRAVENOUS at 11:56

## 2023-01-01 RX ADMIN — LEVETIRACETAM 500 MG: 100 INJECTION, SOLUTION, CONCENTRATE INTRAVENOUS at 02:14

## 2023-01-01 RX ADMIN — LEVETIRACETAM 1000 MG: 10 INJECTION INTRAVENOUS at 10:30

## 2023-01-01 RX ADMIN — SODIUM CHLORIDE, PRESERVATIVE FREE 20 MG: 5 INJECTION INTRAVENOUS at 08:45

## 2023-01-01 RX ADMIN — LORAZEPAM 1 MG: 2 INJECTION INTRAMUSCULAR at 16:23

## 2023-01-01 ASSESSMENT — PULMONARY FUNCTION TESTS
PIF_VALUE: 16
PIF_VALUE: 11
PIF_VALUE: 8
PIF_VALUE: 6
PIF_VALUE: 9
PIF_VALUE: 6
PIF_VALUE: 14
PIF_VALUE: 12
PIF_VALUE: 12
PIF_VALUE: 8
PIF_VALUE: 14
PIF_VALUE: 11

## 2023-01-01 ASSESSMENT — PAIN - FUNCTIONAL ASSESSMENT: PAIN_FUNCTIONAL_ASSESSMENT: NONE - DENIES PAIN

## 2023-01-16 PROBLEM — D69.6 THROMBOCYTOPENIA (HCC): Status: ACTIVE | Noted: 2023-01-01

## 2023-01-16 PROBLEM — D64.9 NORMOCYTIC ANEMIA: Status: ACTIVE | Noted: 2023-01-01

## 2023-01-16 PROBLEM — I62.9 INTRACRANIAL BLEEDING (HCC): Status: ACTIVE | Noted: 2023-01-01

## 2023-01-16 PROBLEM — S06.2X0S MIDLINE SHIFT OF BRAIN DUE TO HEMATOMA (HCC): Status: ACTIVE | Noted: 2023-01-01

## 2023-01-16 PROBLEM — S06.5XAA SUBDURAL HEMATOMA: Status: ACTIVE | Noted: 2023-01-01

## 2023-01-16 PROBLEM — I62.00 SUBDURAL HEMATOMA, NONTRAUMATIC (HCC): Status: ACTIVE | Noted: 2023-01-01

## 2023-01-16 PROBLEM — C92.00 ACUTE MYELOID LEUKEMIA NOT HAVING ACHIEVED REMISSION (HCC): Status: ACTIVE | Noted: 2023-01-01

## 2023-01-16 PROBLEM — G93.89 MIDLINE SHIFT OF BRAIN DUE TO HEMATOMA (HCC): Status: ACTIVE | Noted: 2023-01-01

## 2023-01-16 PROBLEM — I60.9 SAH (SUBARACHNOID HEMORRHAGE) (HCC): Status: ACTIVE | Noted: 2023-01-01

## 2023-01-16 NOTE — ED NOTES
Pt. Arrives to ED via [facility] from home  For c/o AMS. Pt. Was at home with wife LKWT 0830 when wife heard pt No flowsheet data found. Hamzah Alonso Upon entering pt. Was found unresponsive on ground. On EMS arrival pt. Was unresponsive intubated with a 7.5 ett @ 23@ lips. Pt. Received 8mg versed, 100mcg fentanyl, 20 mg etomidate and 110mg succs. Pt. Was found to have right sided deficits, moving left upper and lower with unequal pupils. Pt. Has history of cancer and bladder mass, osorio in place chronic.        Chinmay Zavala, RN  01/16/23 [id number]

## 2023-01-16 NOTE — PLAN OF CARE
Discussion had with family, including patient's wife, daughter, and son-in-law. We discussed patient's current critical condition, imaging, and abnormal labs. They report patient has been frail for approximately a year and prognosis has been guarded during that entire time. Prognosis at this time remains extremely guarded, we discussed current medical management including treatment of anemia and thrombocytopenia with PRBCs and platelets. Neurosurgery is following. We discussed current CODE STATUS given patient's current condition. Daughter states that they have paperwork at home but they are not sure what it says. Wife and daughter are agreeable to making CODE STATUS DNR CCA at this time. Paperwork signed by Dr. Anthony Vasques.     Jayson Arredondo,
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
## 2023-01-16 NOTE — ED NOTES
Dr. Bess at bedside  Dr. Sparks and Dr. Guadalupe at bedside for central line placement   POC ifnuse mannitol, 23.4% IVPB followed by 3% @ 50mL/hr        Demond Denney RN  01/16/23 2072
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
## 2023-01-16 NOTE — ED PROVIDER NOTES
[facility]     Emergency Department     Faculty Attestation    I performed a history and physical examination of the patient and discussed management with the resident. I have reviewed and agree with the residents findings including all diagnostic interpretations, and treatment plans as written. Any areas of disagreement are noted on the chart. I was personally present for the key portions of any procedures. I have documented in the chart those procedures where I was not present during the key portions. I have reviewed the emergency nurses triage note. I agree with the chief complaint, past medical history, past surgical history, allergies, medications, social and family history as documented unless otherwise noted below. Documentation of the HPI, Physical Exam and Medical Decision Making performed by [email or web address] is based on my personal performance of the HPI, PE and MDM. For Physician Assistant/ Nurse Practitioner cases/documentation I have personally evaluated this patient and have completed at least one if not all key elements of the E/M (history, physical exam, and MDM). Additional findings are as noted. Bundle patient arrives via [facility] intubated by [facility] after the patient was found unresponsive. Was at his baseline around 08 30 when he woke up as reported by wife. And then fell to the ground. And was unresponsive. Glucose was greater than 100. Per [facility] patient did have spontaneous movement of his left side but was not moving his right side and had an enlarged left-sided pupil. Intubated with Versed, etomidate and succinylcholine. Patient had additional Versed in flight due to coughing and movement. Concern for possible stroke. Upon arrival patient is intubated. Does have a gag reflex with suctioning by respiratory therapy.   Does have spontaneous movement to his right lower leg and does withdraw to pain to his bilateral lower extremities, minimal movement noted to his bilateral upper extremities. He does have a wound noted to the medial aspect of his right foot with some ecchymosis noted. 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 Patient has no other known medical history. No family is present at initial time of arrival.  Taken to CT scanner due to concern for possible bleed. C-collar is in place as patient did fall to the ground. His pupil exam does show a 7 to 8 mm pupil on his left with a 3 mm pupil on the right. Bilateral breath sounds are present. Absent over the stomach. CT scanner, stroke alert. Will review imaging, chest x-ray, leg stroke panel. Admission to ICU. Sheldon Colindres D.O, M.P.H  Attending Emergency Medicine Physician         Sheldon Colindres DO  01/16/23 1019    10:48 AM EST  EKG shows a normal sinus rhythm with a ventricular rate of 68, prolonged QT of 510, WI interval of 176 QRS of 112. There is PAC noted. Sheldon Colindres DO  01/16/23 1049      12:09 PM EST  Central line placed to Left fem, under my direct supervison. NS, Neuroendovascular, Neurocritical care all at [facility], and given pancytopenia, plan for resuscitation with blood products. Hematology consulted, and admission       Sheldon Colindres DO  01/16/23 1210      Critical care time 35 minutes.   Including discussion with multiple consultations, admission, starting mannitol, hypertonic saline, and 23.4          Sheldon Colindres DO  01/16/23 1212
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
## 2023-01-16 NOTE — PROCEDURES
PROCEDURE NOTE - ARTERIAL LINE PLACEMENT    PATIENT NAME: Yaron [facility] RECORD NO. [id number]  DATE: 1/16/2023  ATTENDING PHYSICIAN:  Dr. Delmar Nance DIAGNOSIS:  Need for blood pressure monitoring  POSTOPERATIVE DIAGNOSIS:  Same  PROCEDURE PERFORMED: Left axillary Arterial Line Insertion  PERFORMING PHYSICIAN:  Radha Funez MD  ESTIMATED BLOOD LOSS:  Less than 25 ml  COMPLICATIONS:  None immediately appreciated. DISCUSSION:  Isaak Ying is a 81 y.o. male who requires invasive pressure monitoring. The history and physical examination were reviewed and confirmed. CONSENT: Unable to be obtained due to the emergent nature of this procedure. PROCEDURE:  A timeout was initiated by the bedside nurse and was confirmed by those present. The patient was placed in a supine position. The skin overlying the L axillary was prepped with chlorhexadine. Through this region, the introducer needle through catheter was inserted into L axillary until pulsatile bright blood was seen in collection tubing. Guidewire was advanced with no resistance. Catheter was advanced into the artery, wire was pulled with brisk bleeding noted. Pressure monitoring setup was connected to the catheter, it aspirated and flushed easily. The catheter was secured to the wrist with 3-0 silk. No immediate complication was evident. All sponge, instrument and needle counts were correct at the completion of the procedure.       Radha Funez MD  2:26 PM, 1/16/23

## 2023-01-16 NOTE — PLAN OF CARE
BRONCHOSPASM/BRONCHOCONSTRICTION     [x]         IMPROVE AERATION/BREATH SOUNDS  [x]   ADMINISTER BRONCHODILATOR THERAPY AS APPROPRIATE  [x]   ASSESS BREATH SOUNDS  [x]   IMPLEMENT AEROSOL/MDI PROTOCOL  [x]   PATIENT EDUCATION AS NEEDED   PROVIDE ADEQUATE OXYGENATION WITH ACCEPTABLE SP02/ABG'S    [x]  IDENTIFY APPROPRIATE OXYGEN THERAPY  [x]   MONITOR SP02/ABG'S AS NEEDED   [x]   PATIENT EDUCATION AS NEEDED   MECHANICAL VENTILATION     [x]  PROVIDE OPTIMAL VENTILATION  [x]   ASSESS FOR EXTUBATION READINESS  [x]   ASSESS FOR WEANING READINESS  [x]  EXTUBATE AS TOLERATED  [x]  IMPLEMENT ADULT MECHANICAL VENTILATION PROTOCOL  [x]  MAINTAIN ADEQUATE OXYGENATION    Problem: Respiratory - Adult  Goal: Achieves optimal ventilation and oxygenation  Outcome: Progressing     PERFORM SPONTANEOUS WEANING TRIAL AS TOLERATED

## 2023-01-16 NOTE — PROGRESS NOTES
Patient paged out as a stroke alert. Per EMS, wife said she found him unresponsive at home. Pt arrived intubated.  unable to assess pt.  still waiting for family to arrive. Chaplains will remain available to offer spiritual and emotional support as needed.      Electronically signed by Fransisco Barreto on 1/16/2023 at 12:13 PM.  Martir Almendarez  [phone]
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
## 2023-01-16 NOTE — PROGRESS NOTES
[address] [place] [address]  Speech Language Pathology    Date: 1/16/2023  Patient Name: Oumou Howard  YOB: 1950   AGE: 81 y.o.   MRN: [id number]        Patient Not Available for Speech Therapy     Due to:  [] Testing  [] Hemodialysis  [] Cancelled by RN  [] Surgery   [x] Intubation/Sedation/Pain Medication  [] Medical instability  [] Other:    Next scheduled treatment: 1/17/23 as appropriate  Completed by: Shanika Mckeon SLP, M.S. [id number] [facility]
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
## 2023-01-16 NOTE — PROGRESS NOTES
Patient received from [facility] intubated at scene, 7.5 ETT secured at 23cm at the lips. ETT secured with a arely tube box. Vent settings as charted. PCXR done , results pending as well as ABG.

## 2023-01-16 NOTE — PROGRESS NOTES
Left message for wife on cell phone to call hospital. Her voicemail identified that phone as belonging to her. Attempted to track down other numbers with no success.     Electronically signed by Rosenda Hayes, on 1/16/2023 at 1:26 PM.  Martir Almendarez  [phone]

## 2023-01-17 PROBLEM — N17.0 ACUTE RENAL FAILURE WITH TUBULAR NECROSIS (HCC): Status: ACTIVE | Noted: 2023-01-01

## 2023-01-17 NOTE — ACP (ADVANCE CARE PLANNING)
Discussion held with patient's wife and two children at bedside. Family states patient had decided to stop treatment for AML in June 2022. They stated they spoke and have decided that patient would not want to remain intubated or any aggressive medical intervention. Code status changed to [facility]. Family does not want any further labs or imaging and they plan for terminal extubation.       SREEDHAR Hedrick - CNP  Neuro Critical Care  1/17/23 10:29 AM
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
## 2023-01-17 NOTE — PLAN OF CARE
Problem: Respiratory - Adult  Goal: Achieves optimal ventilation and oxygenation  1/17/2023 1851 by Monique Wilson RN  Outcome: Progressing  1/17/2023 0812 by Dong Tidwell RCP  Outcome: Progressing     Problem: Discharge Planning  Goal: Discharge to home or other facility with appropriate resources  Outcome: Progressing  Flowsheets  Taken 1/17/2023 1600  Discharge to home or other facility with appropriate resources:   Identify barriers to discharge with patient and caregiver   Arrange for needed discharge resources and transportation as appropriate   Identify discharge learning needs (meds, wound care, etc)  Taken 1/17/2023 0800  Discharge to home or other facility with appropriate resources:   Identify barriers to discharge with patient and caregiver   Arrange for needed discharge resources and transportation as appropriate   Identify discharge learning needs (meds, wound care, etc)     Problem: Safety - Medical Restraint  Goal: Remains free of injury from restraints (Restraint for Interference with Medical Device)  Description: INTERVENTIONS:  1. Determine that other, less restrictive measures have been tried or would not be effective before applying the restraint  2. Evaluate the patient's condition at the time of restraint application  3. Inform patient/family regarding the reason for restraint  4.  Q2H: Monitor safety, psychosocial status, comfort, nutrition and hydration  Outcome: Progressing  Flowsheets  Taken 1/17/2023 1000 by Monique Wilson RN  Remains free of injury from restraints (restraint for interference with medical device): Every 2 hours: Monitor safety, psychosocial status, comfort, nutrition and hydration  Taken 1/17/2023 0800 by Monique Wilson RN  Remains free of injury from restraints (restraint for interference with medical device): Every 2 hours: Monitor safety, psychosocial status, comfort, nutrition and hydration  Taken 1/17/2023 0600 by Tracie Morris RN  Remains free of injury from restraints (restraint for interference with medical device):   Determine that other, less restrictive measures have been tried or would not be effective before applying the restraint   Evaluate the patient's condition at the time of restraint application   Inform patient/family regarding the reason for restraint   Every 2 hours: Monitor safety, psychosocial status, comfort, nutrition and hydration     Problem: Pain  Goal: Verbalizes/displays adequate comfort level or baseline comfort level  Outcome: Progressing

## 2023-01-17 NOTE — PLAN OF CARE
Problem: Respiratory - Adult  Goal: Achieves optimal ventilation and oxygenation  1/16/2023 1942 by Umu Benson RN  Outcome: Progressing  Flowsheets (Taken 1/16/2023 1320)  Achieves optimal ventilation and oxygenation: Assess for changes in respiratory status  1/16/2023 1112 by Genny Dukes RCP  Outcome: Progressing     Problem: Discharge Planning  Goal: Discharge to home or other facility with appropriate resources  Outcome: Progressing  Flowsheets (Taken 1/16/2023 1320)  Discharge to home or other facility with appropriate resources: Identify barriers to discharge with patient and caregiver
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
## 2023-01-17 NOTE — PLAN OF CARE
Order obtained for extubation. SpO2 of 100% on 45% FiO2. Patient extubated and placed on 4 liters/min via nasal cannula. Post extubation SpO2 is 98% with HR  90 bpm and RR 16 breaths/min. Patient had strong cough that was productive of white and tan sputum. Extubation Well tolerated by patient. .   Breath Sounds: clear    Luis Ruiz RCP   5:25 PM

## 2023-01-17 NOTE — PROGRESS NOTES
Endovascular Neurosurgery Progressive Note      Reason for evaluation: Diffuse SAH    SUBJECTIVE:     Intubated, non responsive, but occasionally moves his extremities non purposefully. History of Chief Complaint:        81year old man with AML, papillary tumor of the bladder, CKD, s/p chemo, today woke up fine and suddenly collapse and fell. In the ED, patient was emergently intubated and CT head showed an acute L SDH and acute diffuse SAH. No family at bedside    His Hb is found to be 3.2 and platelet 18 and creatinine of 2.2    Review of Systems:  CONSTITUTIONAL:  negative for fevers, chills, fatigue and malaise    EYES:  negative for double vision, blurred vision and photophobia     HEENT:  negative for tinnitus, epistaxis and sore throat    RESPIRATORY:  intubated   CARDIOVASCULAR:  negative for chest pain, palpitations, syncope, edema    GASTROINTESTINAL:  negative for nausea, vomiting    GENITOURINARY:  negative for incontinence    MUSCULOSKELETAL:  negative for neck or back pain    NEUROLOGICAL:  unresponsive   PSYCHIATRIC:  unresponsive     Review of systems otherwise negative. OBJECTIVE:     Vitals:    01/17/23 0751   BP: 128/60   Pulse: 75   Resp: 20   Temp: 99.9 °F (37.7 °C)   SpO2: 98%        General:  Gen: normal habitus, pale, intubated   HEENT: NCAT, mucosa moist  Cvs: RRR, S1 S2 normal  Resp: symmetric unlabored breathing  Abd: s/nd/nt  Ext: no edema, right leg wound  Skin: right leg wound, warm and dry    Neuro:  Gen: intubated  Lang/speech: intubated  CN: left pupil 6mm but reactive, right pupil 4-3mm, +gag, +withdrawal to pain on all extremities but very slow  Motor: withdraw to pain  Sense: withdraw to pain  Coord: deferred  DTR: deferred  Gait: deferred    NIH Stroke Scale:   1a  Level of consciousness: 3 - responds only with reflex motor or automatic effects or totally unresponsive, flaccid, areflexic   1b. LOC questions:  2 - answers neither question correctly   1c.  LOC commands: 2 - performs neither task correctly   2. Best Gaze: 0 - normal   3. Visual: 3 - bilateral hemianopia (blind including cortical blindness   4. Facial Palsy: 0 - normal symmetric movement   5a. Motor left arm: 3 - no effort against gravity, limb falls   5b. Motor right arm: 3 - no effort against gravity, limb falls   6a. Motor left leg: 3 - no effort against gravity; leg falls to bed immediately   6b  Motor right leg:  3 - no effort against gravity; leg falls to bed immediately   7. Limb Ataxia: 0 - absent   8. Sensory: 0 - normal; no sensory loss   9. Best Language:  3 - mute, global aphasia; no usable speech or auditory comprehension   10. Dysarthria: 2 - severe; patient speech is so slurred as to be unintelligible in the absence of or our of proportion to any dysphagia, or is mute/anarthric    11. Extinction and Inattention: 2 - profound john-inattention or john- inattention to more than one modality. Does not recognize own hand or orients only to one side of space          Total:   29     MRS: 5      LABS:   Reviewed. Lab Results   Component Value Date    HGB 6.8 (LL) 01/17/2023    .4 (HH) 01/17/2023    PLT See Reflexed IPF Result 01/17/2023     (H) 01/17/2023    BUN 32 (H) 01/17/2023    CREATININE 1.86 (H) 01/17/2023    MG 1.6 01/16/2023    APTT 22.9 01/16/2023    INR 1.3 01/17/2023      Lab Results   Component Value Date/Time    COVID19 Not Detected 08/26/2021 09:18 AM       RADIOLOGY:   Images were personally reviewed including:    CT head done on the 1/16/2023: acute L SDH and acute diffuse SAH      ASSESSMENT:     81year old man with hx of papillary tumor of the bladder, AML, s/p chemo, woke up today (1/16/23) fine and suddenly collapse and found to have acute L SDH and acute diffuse SAH without any vessels abnormality on CTA.  Patient also found to have acute severe anemia of 3.2 Hb and platelets of 18    PLAN:     - Hb improved after transfusion, today is 6.8, but platelets still very low at 16 despite transfusion  - NSGY recommendation for L SDH, per Dr Mayra White, patient is too unstable OR due to very low platelet counts  - we will consider DSA if patient is stable    - will follow      Case discussed with Dr. Clementine Page attending.     Radha Simmons MD  Stroke, [facility]  [id number] [place]  Electronically signed 1/17/2023 at 8:08 AM

## 2023-01-17 NOTE — ACP (ADVANCE CARE PLANNING)
Advance Care Planning     Advance Care Planning (ACP) Physician/NP/PA (Provider) Conversation      Date of ACP Conversation: 1/17/2023    Conversation Conducted with:   Wife - Barbara    Health Care Decision Maker:    Current Designated Health Care Decision Maker:  Augusto Jimenez    Care Preferences:    Hospitalization:  \"If your health worsens and it becomes clear that your chance of recovery is unlikely, what would your preference be regarding hospitalization?\"  Currently hospitalized    Ventilation:  \"If you were in your present state of health and suddenly became very ill and were unable to breathe on your own, what would your preference be about the use of a ventilator (breathing machine) if it was available to you?\"    Currently on mechanical ventilation - DNR-CC now    \"If your health worsens and it becomes clear that your chance of recovery is unlikely, what would your preference be about the use of a ventilator (breathing machine) if it was available to you?\"   Currently on mechanical ventilation - DNR-CC now    Resuscitation:  \"CPR works best to restart the heart when there is a sudden event, like a heart attack, in someone who is otherwise healthy. Unfortunately, CPR does not typically restart the heart for people who have serious health conditions or who are very sick.\"    \"In the event your heart stopped as a result of an underlying serious health condition, would you want attempts to be made to restart your heart (answer \"yes\" for attempt to resuscitate) or would you prefer a natural death (answer \"no\" for do not attempt to resuscitate)?\"   DNR-CC     Conversation Outcomes / Follow-Up Plan:   Patient's family at bedside. Made DNR-CC after discussion with neurocritical care this morning. Hospice consulted. Patient to be terminally extubated today.      Length of Voluntary ACP Conversation in minutes:  25 minutes    Mikel Martinez DO  Hospice and Palliative Care Medicine Fellow

## 2023-01-17 NOTE — PROGRESS NOTES
Daily Progress Note  Neuro Critical Care    Patient Name: Oumou Howard  Patient : 1950  Room/Bed: 0129/0129-01  [facility]  Allergies: Allergies   Allergen Reactions    Codeine Other (See Comments)     Burns stomach       CHIEF COMPLAINT:      Subdural hematoma     INTERVAL HISTORY    Initial Presentation (Admitted 23): The patient is a 81 y.o. male presented with altered mental status. Past medical history includes AML diagnosed in 2022 with chemotherapy, hyperlipidemia, hypertension, bladder cancer s/p resection. Initial report given by [facility] to ED physician and RN. Patient was at his baseline around 830 when he woke up. He collapsed shortly after that and was unresponsive. On evaluation by [facility], patient had no movement of his right side, some spontaneous movement of his left side, but enlarged left-sided pupil. He was intubated with Versed, etomidate and succinylcholine. There was concern for stroke, patient was also placed on Versed for additional sedation. History includes AML, details of treatment and extent of disease is unknown at this time. Still trying to get hold of patient's wife. On arrival, patient was intubated and continued to have spontaneous movement of left arm and leg with unequal pupils. Stroke alert called on arrival, patient brought to CT immediately. CT of the head demonstrates acute left hemispheric subdural hematoma, 19 mm in thickness with severe left to right midline shift measuring 14 mm. Also noted diffuse subarachnoid hemorrhage. CTA of the head and neck was negative for LVO, there was no significant stenosis or cerebral aneurysm. CT cervical spine negative for any acute injury or abnormality. Work up in the ED notable for hemoglobin of 3.2, platelets 18, SILVERIO with Cr 2.2 (0.6 one year ago), and evidence of UTI. Transfused 3u PRBC and 1u Platelets. Rocephin ordered.  CVC placed by ER team. Neurosurgery and stroke team also following. Palliative care consult and hematology/oncology consults placed.      Spoke to wife and daughter after patient arrived to the neuro ICU.  Wife notes that patient had not been feeling well over the past few days, he felt congested and intermittently dizzy.  No other known trauma.  This morning prior to arrival, patient was walking to the kitchen and just fell forward and was unresponsive from that point.  Code status changed to DNR-CCA.    Last 24h:   Received an additional 2u PRBC overnight.  Platelets decreased to 16 this AM; transfuse additional 1u platelets this AM.  Sodium 147, 3% on hold.  Repeat CT Head this AM remained overall stable.  Patient remains intubated, off sedation.  He does not open eyes or follow commands.  Family came in this morning and changed code status to DNR-CC.  Palliative Care and Hospice consulted.  Patient terminally extubated at 1242PM.      CURRENT MEDICATIONS:  SCHEDULED MEDICATIONS:   chlorhexidine  15 mL Mouth/Throat BID    famotidine (PEPCID) injection  20 mg IntraVENous Daily    niMODipine  60 mg Per NG tube 6 times per day    levETIRAcetam  500 mg IntraVENous Q12H    cefTRIAXone (ROCEPHIN) IV  1,000 mg IntraVENous Q24H     CONTINUOUS INFUSIONS:   sodium chloride      sodium chloride      sodium chloride Stopped (23 8594)    norepinephrine 12.5 mcg/min (23 7596)     PRN MEDICATIONS:   acetaminophen, sodium chloride, ondansetron **OR** ondansetron    VITALS:  Temperature Range: Temp: 100 °F (37.8 °C) Temp  Av.3 °F (37.4 °C)  Min: 97.5 °F (36.4 °C)  Max: 100.9 °F (38.3 °C)  BP Range: Systolic (24hrs), Av , Min:88 , Max:146     Diastolic (24hrs), Av, Min:45, Max:107    Pulse Range: Pulse  Av  Min: 63  Max: 87  Respiration Range: Resp  Av.8  Min: 12  Max: 32  Current Pulse Ox: SpO2: 99 %  24HR Pulse Ox Range: SpO2  Av.6 %  Min: 90 %  Max: 100 %  Patient Vitals for the past 12 hrs:   BP Temp Temp src Pulse Resp SpO2  01/17/23 0645 127/66 100 °F (37.8 °C) -- 71 20 99 %   01/17/23 0630 (!) 140/69 100.2 °F (37.9 °C) -- 75 19 98 %   01/17/23 0615 (!) 143/61 100 °F (37.8 °C) -- 73 19 98 %   01/17/23 0600 (!) 143/68 100 °F (37.8 °C) -- 71 17 98 %   01/17/23 0545 (!) 140/61 100 °F (37.8 °C) -- 74 20 98 %   01/17/23 0530 (!) 131/58 100 °F (37.8 °C) -- 74 18 98 %   01/17/23 0515 (!) 113/52 100 °F (37.8 °C) -- 71 19 99 %   01/17/23 0500 (!) 103/56 100 °F (37.8 °C) -- 72 18 99 %   01/17/23 0445 129/61 100 °F (37.8 °C) -- 72 20 98 %   01/17/23 0430 127/64 100 °F (37.8 °C) -- 77 23 99 %   01/17/23 0420 -- -- -- 86 23 100 %   01/17/23 0415 128/66 100 °F (37.8 °C) -- 73 20 100 %   01/17/23 0400 (!) 130/57 100.2 °F (37.9 °C) -- 76 24 100 %   01/17/23 0356 (!) 123/57 100.2 °F (37.9 °C) -- 75 20 100 %   01/17/23 0337 -- -- -- 77 21 99 %   01/17/23 0250 125/64 100.2 °F (37.9 °C) -- 75 20 100 %   01/17/23 0200 139/63 (!) 100.8 °F (38.2 °C) -- 74 18 100 %   01/17/23 0135 -- (!) 100.9 °F (38.3 °C) -- 76 19 100 %   01/17/23 0120 -- (!) 100.9 °F (38.3 °C) -- 79 22 100 %   01/17/23 0105 -- (!) 100.8 °F (38.2 °C) -- 76 21 100 %   01/17/23 0050 -- (!) 100.8 °F (38.2 °C) -- 78 20 100 %   01/17/23 0049 (!) 124/59 (!) 100.8 °F (38.2 °C) -- 80 21 100 %   01/17/23 0034 (!) 108/95 (!) 100.6 °F (38.1 °C) -- 81 19 100 %   01/17/23 0019 136/66 100.4 °F (38 °C) -- 87 20 100 %   01/17/23 0004 (!) 124/57 100.2 °F (37.9 °C) -- 81 19 100 %   01/17/23 0002 117/71 100 °F (37.8 °C) -- 83 24 100 %   01/17/23 0000 117/71 100 °F (37.8 °C) -- 83 18 100 %   01/16/23 2357 -- 100 °F (37.8 °C) -- 79 19 100 %   01/16/23 2355 131/63 100 °F (37.8 °C) -- 74 18 100 %   01/16/23 2300 131/61 100 °F (37.8 °C) CORE 73 16 100 %   01/16/23 2200 (!) 121/59 99.9 °F (37.7 °C) -- 71 16 100 %   01/16/23 2130 (!) 116/59 99.7 °F (37.6 °C) -- 71 18 100 %   01/16/23 2120 (!) 111/59 99.7 °F (37.6 °C) -- 72 15 --   01/16/23 2100 (!) 95/52 99.5 °F (37.5 °C) -- 66 15 99 %   01/16/23 2030 (!) 93/50 -- -- -- -- --   01/16/23 2018 -- -- -- 72 14 100 %   01/16/23 2000 (!) 98/53 99.3 °F (37.4 °C) -- 76 15 99 %     Estimated body mass index is 20.98 kg/m² as calculated from the following:    Height as of this encounter: 5' 6\" (1.676 m). Weight as of this encounter: 130 lb (59 kg).  []<16 Severe malnutrition  []16-16.99 Moderate malnutrition  []17-18.49 Mild malnutrition  [x]18.5-24.9 Normal  []25-29.9 Overweight (not obese)  []30-34.9 Obese class 1 (Low Risk)  []35-39.9 Obese class 2 (Moderate Risk)  []?40 Obese class 3 (High Risk)    RECENT LABS:   Lab Results   Component Value Date    WBC PENDING 01/17/2023    HGB 6.8 (LL) 01/17/2023    HCT 22.3 (L) 01/17/2023    PLT See Reflexed IPF Result 01/17/2023     (H) 01/17/2023    K 4.1 01/17/2023     (H) 01/17/2023    CREATININE 1.86 (H) 01/17/2023    BUN 32 (H) 01/17/2023    CO2 17 (L) 01/17/2023    INR 1.3 01/17/2023    LABA1C 5.5 09/02/2014     24 HOUR INTAKE/OUTPUT:  Intake/Output Summary (Last 24 hours) at 1/17/2023 0726  Last data filed at 1/17/2023 0437  Gross per 24 hour   Intake 2677.73 ml   Output 1825 ml   Net 852.73 ml       IMAGING:   CT HEAD WO CONTRAST  Result Date: 1/17/2023  Essentially stable extensive bilateral subarachnoid hemorrhage and moderate left subdural hemorrhage. Moderate midline shift from left to right which is slightly improved. CT HEAD WO CONTRAST  Result Date: 1/16/2023  1. Acute left hemispheric subdural hematoma measuring 19 mm in thickness resulting in severe left to right midline shift measuring 14 mm. 2. Diffuse subarachnoid hemorrhage. The above findings were discussed with Dr. Bing Richardson at 10:38 a.m. on 01/16/2023. CT CERVICAL SPINE WO CONTRAST  Result Date: 1/16/2023  No acute abnormality of the cervical spine. Multilevel degenerative/DDD changes with mild reversal normal cervical lordosis, as above. Nonacute appearing sinus disease. Signs of left mastoiditis and right extramastoiditis.   Likely bilateral cerumen external auditory canals. Correlate clinically. Paraseptal emphysema. Additional findings, as above. XR CHEST PORTABLE  Result Date: 1/16/2023  Support tubes satisfactory. Interval increased interstitial lung abnormalities with probable mild basilar atelectasis or scarring left base and elevation left hemidiaphragm. XR ABDOMEN FOR NG/OG/NE TUBE PLACEMENT  Result Date: 1/16/2023  Enteric tube terminates at the level of the body of the stomach. CTA HEAD NECK W CONTRAST  Result Date: 1/16/2023  1. Redemonstration of the patient's known large left hemispheric subdural hematoma and resulting marked left to right midline shift. 2. No large vessel occlusion, significant stenosis or cerebral aneurysm identified within the brain. 3. No significant arterial stenosis identified within the neck. Labs and Images reviewed with:  [] Dr. Miri Barnes. Dong    [x] Dr. Leonora Membreno  [] Dr. Brandie Curtis  [] There are no new interval images to review. PHYSICAL EXAM       CONSTITUTIONAL:  Intubated, off sedation. Does not open eyes or follow commands. GCS E(1) V(IT) M  (5)   HEAD:  normocephalic, atraumatic    EYES:  PERRL, Oculocephalics intact   ENT:  moist mucous membranes   NECK:  supple, symmetric   LUNGS:  Equal air entry bilaterally, clear   CARDIOVASCULAR:  normal s1 / s2, RRR, distal pulses intact   ABDOMEN:  Soft, no rigidity   NEUROLOGIC:  Mental Status:  Intubated, off sedation. Comatose. Cranial Nerves:    II: Visual fields:  abnormal - absent blink to threat  III: Pupils:  equal, round, reactive to light  III,IV,VI: Extra Ocular Movements: intact oculocephalics, does not track  V: Corneal reflex:  completed. intact  Intact cough reflex    Motor Exam:    Localizes in the right upper extremity. No movement to pain in the left upper extremity. Withdraws to pain in the bilateral lower extremities.          DRAINS:  [x] There are no drains for Neuro Critical Care to monitor at this time.     ASSESSMENT AND PLAN:       The patient is a 82 yo male with a history of AML, bladder CA, MSSA bacteremia who presented to the ED after he collapsed at home. Intubated on scene and transported to ED via [facility]. Found to have a large left hemispheric subdural hematoma with 14mm shift. Labs revealed significant anemia and thrombocytopenia along with leukocytosis. Neurosurgery was consulted but patient was not a surgical candidate due to low platelet count. He was transfused PRBC and platelets. Patient's family came 1/17 and elected to change code status to [facility]. Acute left hemisphere subdural hematoma with 14mm midline shift (brain compression)  Unclear etiology; possibly coagulopathy in setting of pronounced thrombocytopenia, no known trauma  Acute hypoxic respiratory failure secondary to above  AML; patient had stopped treatment in June 2022 per family  Marked anemia and thrombocytopenia secondary to AML  Acute cystitis/urinary tract infection  SILVERIO vs CKD  Poor prognosis given large SDH and AML  Patient had already elected against treatment for his AML  Code status changed to [facility]  OK to continue Keppra 500mg BID while admitted  Morphine, Ativan and Robinol PRN for comfort care  Terminally extubated 1/17 around 1242PM  Palliative Care following  Hospice consulted    DISPOSITION:  [x] OK for out of ICU from Neuro Critical Care standpoint    We will continue to follow along. For any changes in exam or patient status please contact Neuro Critical Care.       Birgit Cruz, APRN - [id number] [facility]  Neuro Critical Care  Pager [phone]  1/17/2023     7:26 AM

## 2023-01-17 NOTE — PLAN OF CARE
Problem: Respiratory - Adult  Goal: Achieves optimal ventilation and oxygenation  1/17/2023 0331 by Alexandro Wright RN  Outcome: Progressing  1/16/2023 1942 by Halina Valentine RN  Outcome: Progressing  Flowsheets (Taken 1/16/2023 1320)  Achieves optimal ventilation and oxygenation: Assess for changes in respiratory status     Problem: Discharge Planning  Goal: Discharge to home or other facility with appropriate resources  1/17/2023 0331 by Alexandro Wright RN  Outcome: Progressing  1/16/2023 1942 by Halina Valentine RN  Outcome: Progressing  Flowsheets (Taken 1/16/2023 1320)  Discharge to home or other facility with appropriate resources: Identify barriers to discharge with patient and caregiver     Problem: Safety - Medical Restraint  Goal: Remains free of injury from restraints (Restraint for Interference with Medical Device)  Description: INTERVENTIONS:  1. Determine that other, less restrictive measures have been tried or would not be effective before applying the restraint  2. Evaluate the patient's condition at the time of restraint application  3. Inform patient/family regarding the reason for restraint  4.  Q2H: Monitor safety, psychosocial status, comfort, nutrition and hydration  Outcome: Progressing

## 2023-01-17 NOTE — CARE COORDINATION
Transitional Planning  Spoke with Palliative Care, plan terminal extubation today, need choice of hospice. Spoke with family, [facility] [address] [place] [address] chosen. Referral sent     230 pm [facility] of [address] [place] [address], spoke with [place], states messages were left with spouse and dtr no call back. Went to patient's room no family present, spoke with RN, number given of Jadne Shaffer, son. Called [facility] back, Liam's number of [phone]. They will reach out to Jaden Shaffer.

## 2023-01-17 NOTE — PLAN OF CARE
Problem: Respiratory - Adult  Goal: Achieves optimal ventilation and oxygenation  1/17/2023 0812 by Adelaide Galindo RCP  Outcome: Progressing     Problem: OXYGENATION/RESPIRATORY FUNCTION  Goal: Patient will maintain patent airway  Outcome: Ongoing  Goal: Patient will achieve/maintain normal respiratory rate/effort  Respiratory rate and effort will be within normal limits for the patient  Outcome: Ongoing    Problem: MECHANICAL VENTILATION  Goal: Patient will maintain patent airway  Outcome: Ongoing  Goal: Oral health is maintained or improved  Outcome: Ongoing  Goal: ET tube will be managed safely  Outcome: Ongoing  Goal: Ability to express needs and understand communication  Outcome: Ongoing  Goal: Mobility/activity is maintained at optimum level for patient  Outcome: Ongoing    Problem: ASPIRATION PRECAUTIONS  Goal: Patients risk of aspiration is minimized  Outcome: Ongoing    Problem: SKIN INTEGRITY  Goal: Skin integrity is maintained or improved  Outcome: Ongoing
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
## 2023-01-18 PROBLEM — Z51.5 ENCOUNTER FOR PALLIATIVE CARE: Status: ACTIVE | Noted: 2023-01-01

## 2023-01-18 NOTE — CARE COORDINATION
PHQ-9 screen deferred d/t pt's current medical condition.  Pt [facility], plans for hospice

## 2023-01-18 NOTE — DEATH NOTES
Death Pronouncement Note  Patient's Name: Emmanuel Hugo   Patient's YOB: 1950  MRN Number: [id number]    Admitting Provider: Wade Moore MD  Attending Provider: Wade Moore MD    Patient was examined and the following were absent: Pulses, Blood Pressure, and Respiratory effort    I declared the patient dead along with George Orellana RN on 1/18/2023 at 5:19 PM    Preliminary Cause of Death: Acute respiratory failure with hypoxia [facility])     Electronically signed by SREEDHAR Nieto CNP on 1/18/23 at 5:21 PM EST

## 2023-01-18 NOTE — PROGRESS NOTES
Endovascular Neurosurgery Progressive Note      Reason for evaluation: Diffuse SAH    SUBJECTIVE:     Patient is comfortable. Family made him Comfort Care yesterday due to the poor prognosis of AML    History of Chief Complaint:        81year old man with AML, papillary tumor of the bladder, CKD, s/p chemo, today woke up fine and suddenly collapse and fell. In the ED, patient was emergently intubated and CT head showed an acute L SDH and acute diffuse SAH. No family at bedside    His Hb is found to be 3.2 and platelet 18 and creatinine of 2.2    Review of Systems:  CONSTITUTIONAL:  negative for fevers, chills, fatigue and malaise    EYES:  negative for double vision, blurred vision and photophobia     HEENT:  negative for tinnitus, epistaxis and sore throat    RESPIRATORY:  intubated   CARDIOVASCULAR:  negative for chest pain, palpitations, syncope, edema    GASTROINTESTINAL:  negative for nausea, vomiting    GENITOURINARY:  negative for incontinence    MUSCULOSKELETAL:  negative for neck or back pain    NEUROLOGICAL:  unresponsive   PSYCHIATRIC:  unresponsive     Review of systems otherwise negative. OBJECTIVE:     Vitals:    01/18/23 1600   BP: (!) 72/40   Pulse: 86   Resp: 19   Temp: 99.9 °F (37.7 °C)   SpO2: (!) 85%        General:  Gen: normal habitus, pale, intubated   HEENT: NCAT, mucosa moist  Cvs: RRR, S1 S2 normal  Resp: symmetric unlabored breathing  Abd: s/nd/nt  Ext: no edema, right leg wound  Skin: right leg wound, warm and dry    Neuro:  Gen: comfortable  Lang/speech: unresponsive  CN: deferred  Motor: deferred  Sense: deferred  Coord: deferred  DTR: deferred  Gait: deferred    NIH Stroke Scale:   1a  Level of consciousness: 3 - responds only with reflex motor or automatic effects or totally unresponsive, flaccid, areflexic   1b. LOC questions:  2 - answers neither question correctly   1c. LOC commands: 2 - performs neither task correctly   2. Best Gaze: 0 - normal   3.  Visual: 3 - bilateral hemianopia (blind including cortical blindness   4. Facial Palsy: 0 - normal symmetric movement   5a. Motor left arm: 3 - no effort against gravity, limb falls   5b. Motor right arm: 3 - no effort against gravity, limb falls   6a. Motor left leg: 3 - no effort against gravity; leg falls to bed immediately   6b  Motor right leg:  3 - no effort against gravity; leg falls to bed immediately   7. Limb Ataxia: 0 - absent   8. Sensory: 0 - normal; no sensory loss   9. Best Language:  3 - mute, global aphasia; no usable speech or auditory comprehension   10. Dysarthria: 2 - severe; patient speech is so slurred as to be unintelligible in the absence of or our of proportion to any dysphagia, or is mute/anarthric    11. Extinction and Inattention: 2 - profound john-inattention or john- inattention to more than one modality. Does not recognize own hand or orients only to one side of space          Total:   29     MRS: 5      LABS:   Reviewed. Lab Results   Component Value Date    HGB 6.8 (LL) 01/17/2023    .4 (HH) 01/17/2023    PLT See Reflexed IPF Result 01/17/2023     (H) 01/17/2023    BUN 32 (H) 01/17/2023    CREATININE 1.86 (H) 01/17/2023    MG 1.6 01/16/2023    APTT 22.9 01/16/2023    INR 1.3 01/17/2023      Lab Results   Component Value Date/Time    COVID19 Not Detected 08/26/2021 09:18 AM       RADIOLOGY:   Images were personally reviewed including:    CT head done on the 1/16/2023: acute L SDH and acute diffuse SAH      ASSESSMENT:     81year old man with hx of papillary tumor of the bladder, AML, s/p chemo, woke up today (1/16/23) fine and suddenly collapse and found to have acute L SDH and acute diffuse SAH without any vessels abnormality on CTA.  Patient also found to have acute severe anemia of 3.2 Hb and platelets of 18    Family made patient [facility] on the 1/17/2023    PLAN:     - comfort care measure per primary team and family wish      Case discussed with Dr. Farhana Regan attending.     Hasmukh Carolina MD  Stroke, [facility]  [id number]  Electronically signed 1/18/2023 at 4:45 PM

## 2023-01-18 NOTE — PROGRESS NOTES
Daily Progress Note  Neuro Critical Care    Patient Name: Alexander King  Patient : 1950  Room/Bed: 0129/0129-01  [facility]  Allergies: Allergies   Allergen Reactions    Codeine Other (See Comments)     Burns stomach       CHIEF COMPLAINT:      Subdural hematoma     INTERVAL HISTORY    Initial Presentation (Admitted 23): The patient is a 81 y.o. male presented with altered mental status. Past medical history includes AML diagnosed in 2022 with chemotherapy, hyperlipidemia, hypertension, bladder cancer s/p resection. Initial report given by [facility] to ED physician and RN. Patient was at his baseline around 830 when he woke up. He collapsed shortly after that and was unresponsive. On evaluation by [facility], patient had no movement of his right side, some spontaneous movement of his left side, but enlarged left-sided pupil. He was intubated with Versed, etomidate and succinylcholine. There was concern for stroke, patient was also placed on Versed for additional sedation. History includes AML, details of treatment and extent of disease is unknown at this time. Still trying to get hold of patient's wife. On arrival, patient was intubated and continued to have spontaneous movement of left arm and leg with unequal pupils. Stroke alert called on arrival, patient brought to CT immediately. CT of the head demonstrates acute left hemispheric subdural hematoma, 19 mm in thickness with severe left to right midline shift measuring 14 mm. Also noted diffuse subarachnoid hemorrhage. CTA of the head and neck was negative for LVO, there was no significant stenosis or cerebral aneurysm. CT cervical spine negative for any acute injury or abnormality. Work up in the ED notable for hemoglobin of 3.2, platelets 18, SILVERIO with Cr 2.2 (0.6 one year ago), and evidence of UTI. Transfused 3u PRBC and 1u Platelets. Rocephin ordered.  CVC placed by ER team. Neurosurgery and stroke team also following. Palliative care consult and hematology/oncology consults placed. Spoke to wife and daughter after patient arrived to the neuro ICU. Wife notes that patient had not been feeling well over the past few days, he felt congested and intermittently dizzy. No other known trauma. This morning prior to arrival, patient was walking to the kitchen and just fell forward and was unresponsive from that point. Code status changed to DNR-CCA. Hospital Course:  : Received an additional 2u PRBC overnight. Platelets decreased to 16 this AM; transfuse additional 1u platelets this AM.  Sodium 147, 3% on hold. Repeat CT Head in AM remained overall stable. Family present in AM and changed code status to [facility]. Palliative Care and Hospice consulted. Patient terminally extubated at 1242PM.      Last 24h:  Low grade fevers this morning, Tmax 38.3C. Rectal Tylenol ordered. Patient is receiving PRN Dilaudid and Ativan for comfort. [address] has been consulted.       CURRENT MEDICATIONS:  SCHEDULED MEDICATIONS:   levETIRAcetam  500 mg IntraVENous Q12H     CONTINUOUS INFUSIONS:      PRN MEDICATIONS:   acetaminophen, HYDROmorphone, glycopyrrolate, LORazepam, LORazepam    VITALS:  Temperature Range: Temp: 99.1 °F (37.3 °C) Temp  Av.5 °F (37.5 °C)  Min: 98.6 °F (37 °C)  Max: 99.9 °F (37.7 °C)  BP Range: Systolic (90DBH), FNE:708 , Min:98 , LQR:126     Diastolic (03OWN), CFI:97, Min:50, Max:71    Pulse Range: Pulse  Av.8  Min: 67  Max: 108  Respiration Range: Resp  Av.5  Min: 14  Max: 26  Current Pulse Ox: SpO2: 90 %  24HR Pulse Ox Range: SpO2  Av.7 %  Min: 90 %  Max: 100 %  Patient Vitals for the past 12 hrs:   Temp Temp src Pulse Resp SpO2   23 0400 99.1 °F (37.3 °C) -- (!) 108 22 90 %   23 0000 98.8 °F (37.1 °C) -- (!) 101 18 93 %   23 2000 99.7 °F (37.6 °C) Bladder 99 16 96 %       Estimated body mass index is 20.98 kg/m² as calculated from the following: Height as of this encounter: 5' 6\" (1.676 m). Weight as of this encounter: 130 lb (59 kg).  []<16 Severe malnutrition  []16-16.99 Moderate malnutrition  []17-18.49 Mild malnutrition  [x]18.5-24.9 Normal  []25-29.9 Overweight (not obese)  []30-34.9 Obese class 1 (Low Risk)  []35-39.9 Obese class 2 (Moderate Risk)  []?40 Obese class 3 (High Risk)    RECENT LABS:   Lab Results   Component Value Date    .4 (HH) 01/17/2023    HGB 6.8 (LL) 01/17/2023    HCT 22.3 (L) 01/17/2023    PLT See Reflexed IPF Result 01/17/2023     (H) 01/17/2023    K 4.1 01/17/2023     (H) 01/17/2023    CREATININE 1.86 (H) 01/17/2023    BUN 32 (H) 01/17/2023    CO2 17 (L) 01/17/2023    INR 1.3 01/17/2023    LABA1C 5.6 01/17/2023     24 HOUR INTAKE/OUTPUT:  Intake/Output Summary (Last 24 hours) at 1/18/2023 0752  Last data filed at 1/18/2023 0600  Gross per 24 hour   Intake 598.38 ml   Output 1550 ml   Net -951.62 ml         IMAGING:   CT HEAD WO CONTRAST  Result Date: 1/17/2023  Essentially stable extensive bilateral subarachnoid hemorrhage and moderate left subdural hemorrhage. Moderate midline shift from left to right which is slightly improved. CT HEAD WO CONTRAST  Result Date: 1/16/2023  1. Acute left hemispheric subdural hematoma measuring 19 mm in thickness resulting in severe left to right midline shift measuring 14 mm. 2. Diffuse subarachnoid hemorrhage. The above findings were discussed with Dr. Ada Lagunas at 10:38 a.m. on 01/16/2023. CT CERVICAL SPINE WO CONTRAST  Result Date: 1/16/2023  No acute abnormality of the cervical spine. Multilevel degenerative/DDD changes with mild reversal normal cervical lordosis, as above. Nonacute appearing sinus disease. Signs of left mastoiditis and right extramastoiditis. Likely bilateral cerumen external auditory canals. Correlate clinically. Paraseptal emphysema. Additional findings, as above. XR CHEST PORTABLE  Result Date: 1/16/2023  Support tubes satisfactory. Interval increased interstitial lung abnormalities with probable mild basilar atelectasis or scarring left base and elevation left hemidiaphragm. XR ABDOMEN FOR NG/OG/NE TUBE PLACEMENT  Result Date: 1/16/2023  Enteric tube terminates at the level of the body of the stomach. CTA HEAD NECK W CONTRAST  Result Date: 1/16/2023  1. Redemonstration of the patient's known large left hemispheric subdural hematoma and resulting marked left to right midline shift. 2. No large vessel occlusion, significant stenosis or cerebral aneurysm identified within the brain. 3. No significant arterial stenosis identified within the neck. Labs and Images reviewed with:  [] Dr. Myrna Bhatt. Dong    [x] Dr. Kayli Perdomo  [] Dr. Lizz Osman  [] There are no new interval images to review. PHYSICAL EXAM       CONSTITUTIONAL:  Stuperous. Does not open eyes or follow commands. Nonverbal.     HEAD:  normocephalic, atraumatic    EYES:  PERRL   ENT:  moist mucous membranes   NECK:  supple, symmetric   LUNGS:  Equal air entry bilaterally, rhonchi   CARDIOVASCULAR:  normal s1 / s2, RRR, distal pulses intact   ABDOMEN:  Soft, no rigidity   NEUROLOGIC:  Mental Status:  Stuperous. Does not open eyes or follow commands. Cranial Nerves:    II: Visual fields:  abnormal - absent blink to threat  III: Pupils:  equal, round, reactive to light  III,IV,VI: Extra Ocular Movements: does not track    Motor Exam:    No spontaneous or purposeful movement noted. Noxious stimuli deferred due to code status. DRAINS:  [x] There are no drains for Neuro Critical Care to monitor at this time. ASSESSMENT AND PLAN:       The patient is a 82 yo male with a history of AML, bladder CA, MSSA bacteremia who presented to the ED after he collapsed at home. Intubated on scene and transported to ED via [address]. Found to have a large left hemispheric subdural hematoma with 14mm shift.   Labs revealed significant anemia and thrombocytopenia along with leukocytosis. Neurosurgery was consulted but patient was not a surgical candidate due to low platelet count. He was transfused PRBC and platelets. Patient's family came 1/17 and elected to change code status to [facility]. Acute left hemisphere subdural hematoma with 14mm midline shift (brain compression)  Unclear etiology; possibly coagulopathy in setting of pronounced thrombocytopenia, no known trauma  Acute hypoxic respiratory failure secondary to above  AML; patient had stopped treatment in June 2022 per family  Marked anemia and thrombocytopenia secondary to AML  Acute cystitis/urinary tract infection  SILVERIO vs CKD  Poor prognosis given large SDH and AML  Patient had already elected against treatment for his AML  [facility]  OK to continue Keppra 500mg IV BID while admitted  Dilaudid, Ativan and Robinol PRN for comfort care  Terminally extubated 1/17 around 1242PM  Palliative Care following  [facility] consulted    DISPOSITION:  [x] OK for transfer out of the Neuro ICU. We will continue to follow along. For any changes in exam or patient status please contact Neuro Critical Care.       Sueann Skiff, KHXD - [address] [facility]  Neuro Critical Care  Pager [phone]  1/18/2023     7:52 AM

## 2023-01-18 NOTE — CARE COORDINATION
Spoke with son Herve Vasquez to clarify plan. Herve Vasquez states they want [facility] .  Herve Vasquez will call Cindi Sexton at [facility] [facility] to schedule family meeting in am. Spoke with Cindi Sexton at [facility] she will try to schedule for am meeting thursday

## 2023-01-18 NOTE — PROGRESS NOTES
RN Tyrese Timmons and ASA Garcia bedside to confirm asystole on monitor. Esme Garcia will contact family. [facility] notified.

## 2023-01-18 NOTE — DISCHARGE SUMMARY
Neuro Critical Care   Discharge Summary      PATIENT NAME: Juan Luis Yu  YOB: 1950  MEDICAL RECORD NO. [id number]  DATE: 1/18/2023  PRIMARY CARE PHYSICIAN: No primary care provider on file. ADMISSION DATE:  1/16/23  DISCHARGE DATE:  Time of death 1/18/23 1719  DISCHARGE DIAGNOSIS:   Patient Active Problem List   Diagnosis Code    HTN (hypertension) I10    Dermatitis L30.9    Cellulitis of ankle R L03.119    Chronic venous insufficiency R/L I87.2    Ulcer of midfoot (HCC) L97.409    Right foot infection L08.9    Ulcer of right foot with fat layer exposed ([address] L97.512    Right foot pain M79.671    SAH (subarachnoid hemorrhage) ([address] I60.9    Subdural hematoma S06. 5XAA    Acute myeloid 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 ([facility]) C92.00    Intracranial bleeding ([facility]) I62.9    Normocytic anemia D64.9    Thrombocytopenia ([facility]) D69.6    Midline shift of brain due to hematoma ([facility] G93.89, S06.2X0S    Acute renal failure with tubular necrosis ([address] N17.0    Encounter for palliative care Z51.5       Stephen De Leon is a 81 y.o. yo male with a history of AML diagnosed in February 2022 with chemotherapy, hyperlipidemia, hypertension, bladder cancer s/p resection who presented to [facility] on 1/16/2023 10:07 AM with altered mental status. Initial report given by [facility] to ED physician and RN. Patient was at his baseline around 830 when he woke up. He collapsed shortly after that and was unresponsive. On evaluation by [facility], patient had no movement of his right side, some spontaneous movement of his left side, but enlarged left-sided pupil. He was intubated with Versed, etomidate and succinylcholine. There was concern for stroke, patient was also placed on Versed for additional sedation. History includes AML, details of treatment and extent of disease is unknown at this time. Still trying to get hold of patient's wife.      On arrival, patient was intubated and continued to have spontaneous movement of left arm and leg with unequal pupils. Stroke alert called on arrival, patient brought to CT immediately. CT of the head demonstrates acute left hemispheric subdural hematoma, 19 mm in thickness with severe left to right midline shift measuring 14 mm. Also noted diffuse subarachnoid hemorrhage. CTA of the head and neck was negative for LVO, there was no significant stenosis or cerebral aneurysm. CT cervical spine negative for any acute injury or abnormality. Work up in the ED notable for hemoglobin of 3.2, platelets 18, SILVERIO with Cr 2.2 (0.6 one year ago), and evidence of UTI. Transfused 3u PRBC and 1u Platelets. Rocephin ordered. CVC placed by ER team. Neurosurgery and stroke team also following. Palliative care consult and hematology/oncology consults placed. Spoke to wife and daughter after patient arrived to the neuro ICU. Wife notes that patient had not been feeling well over the past few days, he felt congested and intermittently dizzy. No other known trauma. This morning prior to arrival, patient was walking to the kitchen and just fell forward and was unresponsive from that point. Code status changed to DNR-CCA. Received an additional 2u PRBC overnight. Platelets decreased to 16 the next morning despite transfusion; transfused additional 1u platelets. Repeat CT Head  AM remained overall stable. Family came to the hospital on  and changed code status to [facility]. Palliative Care and Hospice consulted. Patient terminally extubated at 1242PM.  He  on 23 at 1719.        PROCEDURES:    N/A    PHYSICAL EXAMINATION        N/A     LABS/IMAGING     Recent Labs     23  1010 23  1034 23  1303 23  2145 23  0116 23  0325 23  0534 23  0650   WBC  --  165.9*   < > 114.4* 125.9*  --  116.4*  --    HGB  --  3.2*   < > 5.4* 5.8* 6.2* 6.8*  --    HCT  --  12.2*   < > 17.9* 19.0* 20.3* 22.3*  --    PLT  --  See Reflexed IPF Result   < > See Reflexed IPF Result See Reflexed IPF Result  --  See Reflexed IPF Result  --    NA  --  135   < >  --  141  --  145* 147*   K  --  4.2  --   --   --   --  4.1  --    CL  --  105  --   --   --   --  116*  --    CO2  --  19*  --   --   --   --  17*  --    BUN  --  40*  --   --   --   --  32*  --    CREATININE 2.38* 2.20*  --   --   --   --  1.86*  --     < > = values in this interval not displayed. CT HEAD WO CONTRAST  Result Date: 1/17/2023  EXAMINATION: CT OF THE HEAD WITHOUT CONTRAST  1/17/2023 9:49 am TECHNIQUE: CT of the head was performed without the administration of intravenous contrast. Automated exposure control, iterative reconstruction, and/or weight based adjustment of the mA/kV was utilized to reduce the radiation dose to as low as reasonably achievable. COMPARISON: 01/16/2023 HISTORY: ORDERING SYSTEM PROVIDED HISTORY: stability scan, follow SDH TECHNOLOGIST PROVIDED HISTORY: stability scan, follow SDH Reason for Exam: stability scan follow SDH FINDINGS: BRAIN/VENTRICLES: Large amount of bilateral subarachnoid hemorrhage is not significantly changed. There is a moderate-sized acute left subdural hematoma which is unchanged. The blood products however have evolved since previous exam and are now more hyperdense. Moderate midline shift from left to right is slightly improved measuring 11 mm previously measuring 14 mm. ORBITS: The visualized portion of the orbits demonstrate no acute abnormality. SINUSES: Moderate paranasal sinus disease is not significantly changed. SOFT TISSUES/SKULL:  No acute abnormality of the visualized skull or soft tissues. Essentially stable extensive bilateral subarachnoid hemorrhage and moderate left subdural hemorrhage. Moderate midline shift from left to right which is slightly improved.      CT HEAD WO CONTRAST  Result Date: 1/16/2023  EXAMINATION: CT OF THE HEAD WITHOUT CONTRAST  1/16/2023 10:21 am TECHNIQUE: CT of the head was performed without the administration of intravenous contrast. Automated exposure control, iterative reconstruction, and/or weight based adjustment of the mA/kV was utilized to reduce the radiation dose to as low as reasonably achievable. COMPARISON: None HISTORY: ORDERING SYSTEM PROVIDED HISTORY: found down at home, unequal pupils, weakness to right arm TECHNOLOGIST PROVIDED HISTORY: found down at home, unequal pupils, weakness to right arm Decision Support Exception - unselect if not a suspected or confirmed emergency medical condition->Emergency Medical Condition (MA) Reason for Exam: found down at home, unequal pupils, weakness to right arm FINDINGS: BRAIN/VENTRICLES: There is a large left hemispheric acute subdural hematoma measuring 19 mm in thickness. There is resulting left to right midline shift measuring 14 mm. There is diffuse subarachnoid hemorrhage. No acute infarct is identified. ORBITS: Limited evaluation of the orbits is unremarkable. SINUSES: There is mucosal thickening throughout the paranasal sinuses. SOFT TISSUES/SKULL: No skull fracture is identified. 1. Acute left hemispheric subdural hematoma measuring 19 mm in thickness resulting in severe left to right midline shift measuring 14 mm. 2. Diffuse subarachnoid hemorrhage. The above findings were discussed with Dr. Deb Rob at 10:38 a.m. on 01/16/2023. CT CERVICAL SPINE WO CONTRAST  Result Date: 1/16/2023  EXAMINATION: CT OF THE CERVICAL SPINE WITHOUT CONTRAST 1/16/2023 10:26 am TECHNIQUE: CT of the cervical spine was performed without the administration of intravenous contrast. Multiplanar reformatted images are provided for review. Automated exposure control, iterative reconstruction, and/or weight based adjustment of the mA/kV was utilized to reduce the radiation dose to as low as reasonably achievable. COMPARISON: None.  HISTORY: ORDERING SYSTEM PROVIDED HISTORY: fall at home TECHNOLOGIST PROVIDED HISTORY: fall at home Decision Support Exception - unselect if not a suspected or confirmed emergency medical condition->Emergency Medical Condition (MA) Reason for Exam: fall at home FINDINGS: BONES/ALIGNMENT: Somewhat degraded by motion; no acute fracture or traumatic malalignment; nonacute appearing 3 mm anterolisthesis C3 on C4. Mild reversal normal cervical lordosis, centered C4-C5, likely chronic. Mild anterior wedging C5, also likely chronic. DEGENERATIVE CHANGES: Advanced multilevel DJD/DDD most severe C4-T1 with intervertebral disc space narrowing, spondylosis, uncovertebral arthropathy and disc osteophyte, latter greatest C6-C7. No severe canal impingement; probable significant neural foraminal impingement, most severe C4-C5/C3-C4 on left, to lesser degree C2-C3 bilaterally. SOFT TISSUES: There is no prevertebral soft tissue swelling. ETT identified. Paraseptal emphysematous changes both upper lung zones, with apical scarring, greater on the right. Mucosal thickening with near complete opacification sphenoid sinuses. Probable cerumen both external auditory canals, and bilateral soft tissue within multiple mastoid air cells and the inner ear on the right. Vascular calcifications. No acute abnormality of the cervical spine. Multilevel degenerative/DDD changes with mild reversal normal cervical lordosis, as above. Nonacute appearing sinus disease. Signs of left mastoiditis and right extramastoiditis. Likely bilateral cerumen external auditory canals. Correlate clinically. Paraseptal emphysema. Additional findings, as above. XR CHEST PORTABLE  Result Date: 1/16/2023  EXAMINATION: ONE XRAY VIEW OF THE CHEST 1/16/2023 10:20 am COMPARISON: AP chest from 09/03/2014 HISTORY: ORDERING SYSTEM PROVIDED HISTORY: confirm intubation placement, AMS TECHNOLOGIST PROVIDED HISTORY: confirm intubation placement, AMS FINDINGS: ETT tip position satisfactory, proximally 4.7 cm above the eunice.   Enteric tube tip and side hole project well below the left hemidiaphragm. Overlying ECG monitor leads/gown snaps. Cardiac silhouette WNL in size for AP technique, but slightly more prominent as compared to 2014. Mediastinal structures midline and appropriate for supine positioning. Increased interstitial markings with some basilar rectus or scarring left base. No confluent airspace abnormality or blunting of the costophrenic angles. No pneumothorax. Elevation left hemidiaphragm. Interval progression DJD/DDD thoracic spine; evidence bilateral chronic rotator cuff tears. Support tubes satisfactory. Interval increased interstitial lung abnormalities with probable mild basilar atelectasis or scarring left base and elevation left hemidiaphragm. XR ABDOMEN FOR NG/OG/NE TUBE PLACEMENT  Result Date: 1/16/2023  EXAMINATION: ONE SUPINE XRAY VIEW(S) OF THE ABDOMEN 1/16/2023 10:57 am COMPARISON: None. HISTORY: ORDERING SYSTEM PROVIDED HISTORY: Confirmation of course of NG/OG/NE tube and location of tip of tube TECHNOLOGIST PROVIDED HISTORY: Confirmation of course of NG/OG/NE tube and location of tip of tube Portable? ->Yes FINDINGS: Enteric tube terminates at the level of the body of the stomach. No dilated bowel in the field of view. Enteric tube terminates at the level of the body of the stomach. CTA HEAD NECK W CONTRAST  Result Date: 1/16/2023  EXAMINATION: CTA OF THE HEAD AND NECK WITH CONTRAST, 1/16/2023 10:34 am TECHNIQUE: CTA of the head and neck was performed with the administration of intravenous contrast. Multiplanar reformatted images are provided for review. MIP images are provided for review. Stenosis of the internal carotid arteries measured using NASCET criteria. Automated exposure control, iterative reconstruction, and/or weight based adjustment of the mA/kV was utilized to reduce the radiation dose to as low as reasonably achievable. COMPARISON: CT brain earlier same day.  HISTORY: ORDERING SYSTEM PROVIDED HISTORY: SAH TECHNOLOGIST PROVIDED HISTORY: [facility] Decision Support Exception - unselect if not a suspected or confirmed emergency medical condition->Emergency Medical Condition (MA) Reason for Exam: SAH FINDINGS: CTA NECK: AORTIC ARCH/ARCH VESSELS: No dissection or arterial injury. No significant stenosis of the brachiocephalic or subclavian arteries. CAROTID ARTERIES: There is calcified atherosclerotic plaque at the origins of the internal carotid arteries. There is no significant stenosis of the internal carotid arteries based on NASCET criteria. VERTEBRAL ARTERIES: No dissection, arterial injury, or significant stenosis. SOFT TISSUES: Paraseptal and centrilobular emphysematous changes are present within the upper lobes. There is no pathologically enlarged lymphadenopathy or soft tissue mass identified within the neck. The parotid glands, submandibular glands and the thyroid gland are unremarkable. BONES: Severe multilevel disc space narrowing and endplate sclerosis is present throughout the cervical spine. No lytic or blastic osseous lesions are identified. 3D surface reformations were performed and concur with the above findings. CTA HEAD: ANTERIOR CIRCULATION: Atherosclerotic calcifications are present within the cavernous segments of the internal carotid arteries without significant stenosis evident. The anterior and middle cerebral arteries are normal. There is no significant stenosis or aneurysm identified. POSTERIOR CIRCULATION: The distal vertebral arteries are normal in appearance. The basilar artery is normal.  No significant stenosis or aneurysm is identified. The posterior cerebral arteries are normal in appearance. OTHER: No dural venous sinus thrombosis on this non-dedicated study. BRAIN: The patient's known left hemispheric subdural hematoma resulting in marked left right midline shift is noted, as seen on the noncontrast CT brain, reported separately.  3D surface reformations were performed and concur with the above findings. 1. Redemonstration of the patient's known large left hemispheric subdural hematoma and resulting marked left to right midline shift. 2. No large vessel occlusion, significant stenosis or cerebral aneurysm identified within the brain. 3. No significant arterial stenosis identified within the neck.        DISCHARGE INSTRUCTIONS     N/A      Skyler Current, APRN - CNP  Neuro Critical Care  2023, 5:22 PM

## 2023-01-18 NOTE — SIGNIFICANT EVENT
Family not present at the time of patient's death. I left a message for Luz Hargrove on her cell phone to call the hospital.  I was not able to reach patient's second emergency contact, his daughter Diann Richey, and her voicemail is not set up. I was able to contact patient's son in law [facility] and inform him of patient's passing. [facility] states he is headed back to Barbara's house shortly where the rest of the family is gathered and will let them know of the patient's passing. I asked [facility] to please contact me to make sure that the family is aware and if they have any questions. I spoke with [facility] via telephone again, he is with patient's wife Luz Hargrove and patient's children and they are heard confirming that they are aware of patient's passing. They would like  to call Liam's cell phone at [phone].     SREEDHAR Vega CNP  Neuro Critical Care  01/18/23 5:30 PM

## 2023-01-18 NOTE — PLAN OF CARE
Problem: Respiratory - Adult  Goal: Achieves optimal ventilation and oxygenation  1/18/2023 0433 by Arlen Mosley RN  Outcome: Progressing     Problem: Discharge Planning  Goal: Discharge to home or other facility with appropriate resources  1/18/2023 0433 by Arlen Mosley RN  Outcome: Progressing     Problem: Safety - Medical Restraint  Goal: Remains free of injury from restraints (Restraint for Interference with Medical Device)  Description: INTERVENTIONS:  1. Determine that other, less restrictive measures have been tried or would not be effective before applying the restraint  2. Evaluate the patient's condition at the time of restraint application  3. Inform patient/family regarding the reason for restraint  4. Q2H: Monitor safety, psychosocial status, comfort, nutrition and hydration  1/18/2023 0433 by Arlen Mosley RN  Outcome: Progressing     Problem: Pain  Goal: Verbalizes/displays adequate comfort level or baseline comfort level  1/18/2023 0433 by Arlen Mosley RN  Outcome: Progressing     Problem: Skin/Tissue Integrity  Goal: Absence of new skin breakdown  Description: 1. Monitor for areas of redness and/or skin breakdown  2. Assess vascular access sites hourly  3. Every 4-6 hours minimum:  Change oxygen saturation probe site  4. Every 4-6 hours:  If on nasal continuous positive airway pressure, respiratory therapy assess nares and determine need for appliance change or resting period.   Outcome: Progressing     Problem: ABCDS Injury Assessment  Goal: Absence of physical injury  Outcome: Progressing
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
## 2023-01-19 NOTE — PROGRESS NOTES
[facility] [address]   Patient Death Note  DEATH   Shift date: 2023    Shift day: Wednesday  Shift #: 2                 Room # 0129/0129-01   Name: Sandra Avila            Age: 81 y.o. Gender: male          Voodoo: Non-Religious      Place of Baptist: unknown  Admit Date & Time: 2023 10:07 AM     Referral: Nurse   Actual date of death: 2023   TOD: 1719       SITUATION AT DEATH:  Cardiac arrest    IS THIS A 'S CASE? Yes    SPIRITUAL/EMOTIONAL INTERVENTION:   contacted family and provided space for feelings, thoughts, and concerns. Discussed end-of-life process. Assisted with release of body form. Wife wants calls to go to Keyla Walter (son-in-law). Questions were raised regarding .  gave contact information to wife to follow up with the  regarding questions. Family Received Grief Packet? No       NAME AND PHONE NUMBER OF DOCTOR SIGNING DEATH CERTIFICATE:        Copy of COMPLETED Release of Body Form Received? Yes    Patient's belongings: None     HOME:  Name: [address] [place] [address] Drive: unknown  Phone Number: unknown    NEXT OF KIN:  Name: Keyla Walter / Nicolás Georgeard  Relationship: Son-in-law / Wife  Street Address: Unknown  City: unknown  State: unknown  Zip code: unknown   Phone Number: [phone] / [phone]    ANY FOLLOW-UP NEEDED? No    IF SO, WHAT? None    Electronically signed by Ryne Chakraborty on 2023 at 7:02 PM.  [facility]  [phone]     23   Encounter Summary   Service Provided For: Patient   Referral/Consult From: Nurse   Support System Family members   Last Encounter  23   Complexity of Encounter High   Begin Time 1719   End Time  1850   Total Time Calculated 91 min   Encounter    Type Follow up   Grief, Loss, and Adjustments   Type Death   Assessment/Intervention/Outcome   Assessment Calm; Complicated grieving;Coping   Intervention Active listening;Discussed illness injury and its impact; Explored Coping Skills/Resources; End of Life Care   Outcome Engaged in conversation;Expressed feelings, needs, and concerns     Electronically signed by Lucy Felton on 1/18/2023 at 7:03 PM

## 2023-01-19 NOTE — PLAN OF CARE
Problem: Respiratory - Adult  Goal: Achieves optimal ventilation and oxygenation  Outcome: Progressing     Problem: Discharge Planning  Goal: Discharge to home or other facility with appropriate resources  Outcome: Progressing  Flowsheets (Taken 1/18/2023 0800)  Discharge to home or other facility with appropriate resources:   Identify barriers to discharge with patient and caregiver   Arrange for needed discharge resources and transportation as appropriate   Identify discharge learning needs (meds, wound care, etc)     Problem: Safety - Medical Restraint  Goal: Remains free of injury from restraints (Restraint for Interference with Medical Device)  Description: INTERVENTIONS:  1. Determine that other, less restrictive measures have been tried or would not be effective before applying the restraint  2. Evaluate the patient's condition at the time of restraint application  3. Inform patient/family regarding the reason for restraint  4. Q2H: Monitor safety, psychosocial status, comfort, nutrition and hydration  Outcome: Progressing     Problem: Pain  Goal: Verbalizes/displays adequate comfort level or baseline comfort level  Outcome: Progressing     Problem: Skin/Tissue Integrity  Goal: Absence of new skin breakdown  Description: 1. Monitor for areas of redness and/or skin breakdown  2. Assess vascular access sites hourly  3. Every 4-6 hours minimum:  Change oxygen saturation probe site  4. Every 4-6 hours:  If on nasal continuous positive airway pressure, respiratory therapy assess nares and determine need for appliance change or resting period.   Outcome: Progressing     Problem: ABCDS Injury Assessment  Goal: Absence of physical injury  Outcome: Progressing
